# Patient Record
Sex: MALE | Race: WHITE | NOT HISPANIC OR LATINO | Employment: FULL TIME | URBAN - METROPOLITAN AREA
[De-identification: names, ages, dates, MRNs, and addresses within clinical notes are randomized per-mention and may not be internally consistent; named-entity substitution may affect disease eponyms.]

---

## 2023-06-30 ENCOUNTER — LAB VISIT (OUTPATIENT)
Dept: LAB | Facility: HOSPITAL | Age: 58
End: 2023-06-30
Attending: UROLOGY
Payer: COMMERCIAL

## 2023-06-30 ENCOUNTER — OFFICE VISIT (OUTPATIENT)
Dept: UROLOGY | Facility: CLINIC | Age: 58
End: 2023-06-30
Payer: COMMERCIAL

## 2023-06-30 VITALS
HEIGHT: 73 IN | SYSTOLIC BLOOD PRESSURE: 135 MMHG | DIASTOLIC BLOOD PRESSURE: 73 MMHG | BODY MASS INDEX: 33.9 KG/M2 | WEIGHT: 255.75 LBS | HEART RATE: 53 BPM

## 2023-06-30 DIAGNOSIS — R97.20 ELEVATED PSA: ICD-10-CM

## 2023-06-30 DIAGNOSIS — R97.20 ELEVATED PSA: Primary | ICD-10-CM

## 2023-06-30 DIAGNOSIS — R97.20 INCREASED PROSTATE SPECIFIC ANTIGEN (PSA) VELOCITY: ICD-10-CM

## 2023-06-30 LAB
BILIRUBIN, UA POC OHS: NEGATIVE
BLOOD, UA POC OHS: NEGATIVE
CLARITY, UA POC OHS: CLEAR
COLOR, UA POC OHS: YELLOW
CREAT SERPL-MCNC: 1.1 MG/DL (ref 0.5–1.4)
EST. GFR  (NO RACE VARIABLE): >60 ML/MIN/1.73 M^2
GLUCOSE, UA POC OHS: NEGATIVE
KETONES, UA POC OHS: NEGATIVE
LEUKOCYTES, UA POC OHS: ABNORMAL
NITRITE, UA POC OHS: NEGATIVE
PH, UA POC OHS: 6.5
PROSTATE SPECIFIC ANTIGEN, TOTAL: 3 NG/ML (ref 0–4)
PROTEIN, UA POC OHS: NEGATIVE
PSA FREE MFR SERPL: 23.67 %
PSA FREE SERPL-MCNC: 0.71 NG/ML (ref 0–1.5)
SPECIFIC GRAVITY, UA POC OHS: 1.02
UROBILINOGEN, UA POC OHS: 0.2

## 2023-06-30 PROCEDURE — 3078F PR MOST RECENT DIASTOLIC BLOOD PRESSURE < 80 MM HG: ICD-10-PCS | Mod: CPTII,S$GLB,, | Performed by: UROLOGY

## 2023-06-30 PROCEDURE — 3008F PR BODY MASS INDEX (BMI) DOCUMENTED: ICD-10-PCS | Mod: CPTII,S$GLB,, | Performed by: UROLOGY

## 2023-06-30 PROCEDURE — 99999 PR PBB SHADOW E&M-NEW PATIENT-LVL III: ICD-10-PCS | Mod: PBBFAC,,, | Performed by: UROLOGY

## 2023-06-30 PROCEDURE — 3008F BODY MASS INDEX DOCD: CPT | Mod: CPTII,S$GLB,, | Performed by: UROLOGY

## 2023-06-30 PROCEDURE — 1159F PR MEDICATION LIST DOCUMENTED IN MEDICAL RECORD: ICD-10-PCS | Mod: CPTII,S$GLB,, | Performed by: UROLOGY

## 2023-06-30 PROCEDURE — 99205 OFFICE O/P NEW HI 60 MIN: CPT | Mod: S$GLB,,, | Performed by: UROLOGY

## 2023-06-30 PROCEDURE — 1159F MED LIST DOCD IN RCRD: CPT | Mod: CPTII,S$GLB,, | Performed by: UROLOGY

## 2023-06-30 PROCEDURE — 82565 ASSAY OF CREATININE: CPT | Performed by: UROLOGY

## 2023-06-30 PROCEDURE — 87086 URINE CULTURE/COLONY COUNT: CPT | Performed by: UROLOGY

## 2023-06-30 PROCEDURE — 99999 PR PBB SHADOW E&M-NEW PATIENT-LVL III: CPT | Mod: PBBFAC,,, | Performed by: UROLOGY

## 2023-06-30 PROCEDURE — 81003 URINALYSIS AUTO W/O SCOPE: CPT | Mod: QW,S$GLB,, | Performed by: UROLOGY

## 2023-06-30 PROCEDURE — 1160F PR REVIEW ALL MEDS BY PRESCRIBER/CLIN PHARMACIST DOCUMENTED: ICD-10-PCS | Mod: CPTII,S$GLB,, | Performed by: UROLOGY

## 2023-06-30 PROCEDURE — 84154 ASSAY OF PSA FREE: CPT | Performed by: UROLOGY

## 2023-06-30 PROCEDURE — 3078F DIAST BP <80 MM HG: CPT | Mod: CPTII,S$GLB,, | Performed by: UROLOGY

## 2023-06-30 PROCEDURE — 81003 POCT URINALYSIS(INSTRUMENT): ICD-10-PCS | Mod: QW,S$GLB,, | Performed by: UROLOGY

## 2023-06-30 PROCEDURE — 3075F PR MOST RECENT SYSTOLIC BLOOD PRESS GE 130-139MM HG: ICD-10-PCS | Mod: CPTII,S$GLB,, | Performed by: UROLOGY

## 2023-06-30 PROCEDURE — 99205 PR OFFICE/OUTPT VISIT, NEW, LEVL V, 60-74 MIN: ICD-10-PCS | Mod: S$GLB,,, | Performed by: UROLOGY

## 2023-06-30 PROCEDURE — 36415 COLL VENOUS BLD VENIPUNCTURE: CPT | Performed by: UROLOGY

## 2023-06-30 PROCEDURE — 3075F SYST BP GE 130 - 139MM HG: CPT | Mod: CPTII,S$GLB,, | Performed by: UROLOGY

## 2023-06-30 PROCEDURE — 1160F RVW MEDS BY RX/DR IN RCRD: CPT | Mod: CPTII,S$GLB,, | Performed by: UROLOGY

## 2023-06-30 RX ORDER — ACETAMINOPHEN 325 MG/1
325 TABLET ORAL EVERY 6 HOURS PRN
COMMUNITY

## 2023-06-30 RX ORDER — TIRZEPATIDE 15 MG/.5ML
INJECTION, SOLUTION SUBCUTANEOUS
COMMUNITY
Start: 2023-05-11

## 2023-06-30 RX ORDER — ALFUZOSIN HYDROCHLORIDE 10 MG/1
10 TABLET, EXTENDED RELEASE ORAL
Qty: 30 TABLET | Refills: 11 | Status: SHIPPED | OUTPATIENT
Start: 2023-06-30 | End: 2024-06-29

## 2023-06-30 NOTE — PROGRESS NOTES
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 Stopped it about 1 month ago as was having orthostasis and had cards adjust PM settings but now back to baseline settings. Pacemaker for bradycardia . Dr Branch.  Tried daily cialis prior bc also mild ed but no real improvement in luts  Testosterone off and on for years. Stopped when psa went up 6 mos ago. Has been off   Wasn't taking it regularly anyways before - injected about every 6 weeks not every 2 weeks.   No known history of uti prostatitis.   Father  early 50s so no known CaP in family.   AUA symptom score 17/4 (4: Weak stream; 3: Emptying, intermittency, straining; 2: Frequency; 1: Urgency, sleeping)  Flomax helps 7/10 and felt that he was voiding better on it, but discontinued it as above.  Cscope at 51, and had another with 12mm polyp and is on 3 yr plan.  No hematuria. Is on tractor often has seen brown urine but also with poor fluid intake      Past Medical History:   Diagnosis Date    Weight loss        Past Surgical History:   Procedure Laterality Date    achilles tendon laser      INSERTION OF PACEMAKER      lasix to r eye       muscle sling to r eye       r wrist scope      right eye retnia detachment         No family history on file.    Social History     Socioeconomic History    Marital status:        Review of patient's allergies indicates:  No Known Allergies    Medications Reviewed: see MAR    Focused Physical Exam    Vitals:    23 0915   BP: 135/73   Pulse: (!) 53     Body mass index is 33.74 kg/m². Weight: 116 kg  "(255 lb 11.7 oz) Height: 6' 1" (185.4 cm)       Abdomen: Soft, non-tender, nondistended, no CVA tenderness  :  circ normal phallus without plaques/lesions, orthotopic urethral meatus, bilaterally desc testes in normal scrotum without mass or lesion  MARCIN: normal sphincter tone, no masses, no hemmorrhoids   PROSTATE: 40g, no nodules, non-tender, symmetrical.       LABS:    Recent Results (from the past 336 hour(s))   POCT Urinalysis(Instrument)    Collection Time: 06/30/23  9:32 AM   Result Value Ref Range    Color, POC UA Yellow Yellow, Straw, Colorless    Clarity, POC UA Clear Clear    Glucose, POC UA Negative Negative    Bilirubin, POC UA Negative Negative    Ketones, POC UA Negative Negative    Spec Grav POC UA 1.020 1.005 - 1.030    Blood, POC UA Negative Negative    pH, POC UA 6.5 5.0 - 8.0    Protein, POC UA Negative Negative    Urobilinogen, POC UA 0.2 <=1.0    Nitrite, POC UA Negative Negative    WBC, POC UA Small (A) Negative         Assessment/Diagnosis:    1. Elevated PSA  POCT Urinalysis(Instrument)    PSA, Total and Free    Creatinine, Serum    MRI Prostate W W/O Contrast    Urine culture      2. Increased prostate specific antigen (PSA) velocity  MRI Prostate W W/O Contrast          Plans:  I had a long discussion with the patient regarding the natural history of cancer in men as well as when diagnostics are indicated. We also discussed differential for elevated psa which also includes benign enlargement and prostatitis.  We did discuss that an elevated PSA is considered a PSA greater than 4 because statistically 20% of people in this value range are found to have prostate cancer, however we also discussed a bit about PSA velocity and trends and age specific psa elevations. Given his age adjusted elevation and velocity rise, I therefore recommended further evaluation to evaluate for underlying malignancy.  We discussed biopsy and in detail, including 1% risk of infectious complications including " sepsis but that it is an otherwise safe diagnostic procedure with expected hematuria hematospermia after.      However before moving to biopsy, given his history of testosterone replacement therapy which contributes directly to benign growth of prostate and prostate enlargement as well as worsening of obstructive lower urinary tract symptoms which he has, as well as for further evaluation and data, will get free and total PSA and MRI of the prostate.  We did discuss that free PSA percentage greater than 30% is normal with a single digit risk of underlying malignancy, and also noting the pitfalls that a free PSA is most accurate with a true PSA elevation between 4 and 10.  However, can look at trends.  We also reviewed that MRI prostate does not rule out disease if it is negative or has no concerning index lesions, however if positive, these index lesions can be targeted at time of biopsy    I went over the details of a transrectal ultrasound-guided biopsy of the prostate, and described the technique in detail.   The patient will be given local injection anesthetic to block the prostate so as to minimize any pain. 12-14 biopsy specimens will be taken. These will be sent for histopathology analysis.   Complications include bleeding, fever and chills. He was also instructed to watch for any signs of fever. If he does have any fever or chills after, he was advised to come to the emergency room right away for intravenous antibiotics and possible admission to the hospital. He is to refrain from any strenuous activity including sexual activity for the next 72 hours after biopsy. He was also advised that he may have blood while urinating, during bowel movements as well as during ejaculations. He was given a prebiopsy/postbiopsy instruction sheet was reminding him to avoid aspirin and blood thinners for 7 days prior, take the Rxed antibiotics the day before, day of, day after biopsy, and perform a fleet enema at home morning  of biopsy. All questions he had were answered in detail.      Instructions for prostate biopsy and procedure in detail reviewed as above in case we are to proceed, but will follow his PSA free and total and MRI of the prostate in the interim.  He does have a pacemaker, so will need cardiac clearance for MRI, and though his pacemaker is MRI safe, without established cardiology at our facility, can not have MRI prostate at [facility] will place referral to [facility].  Will get labs today and forward as well to [facility] with MRI referral.  Patient will get copy of his MRI safety card and we will cc Dr. Branch for clearance to proceed.    In regards to his moderate obstructive lower urinary tract symptoms with enlarged prostate on digital rectal exam and potential orthostasis with Flomax, we did have a risk benefit discussion about management of his BPH/LUTS as well whether or not malignancy is present.  I did recommend if we are to proceed with prostate biopsy to add on cystoscopy at the same time and discussed procedure in detail diagnostic flexible cystourethroscopy including the use of local anesthesia with xylocaine jelly and he is agreeable to proceed.  In the interim, advise trial of Uroxatral as alpha-blocker which has a lower side effect profile and may control his LUTS.  He did find good urinary symptom control benefit on Flomax, and consider going back on it, but would rather trial alternative alpha-blocker with lower side effect profile.  Certainly if similar effects, but is tolerable and LUTS are equivalent, can choose which went to use.  Likewise if has benefit on Uroxatral but not as much as Flomax, can try Flomax again and if asymptomatic continue.  However if symptoms persist should go back to alternative alpha-blocker.  If we are not to proceed with biopsy, will hold off on cystoscopy and lower tract evaluation at this time and continue to monitor his LUTS on medical therapy with revisit in the future as we  follow his PSA free and total every 6 months.  I did however advise him for both of the above issues to can to new remaining off of his testosterone replacement therapy.    Total time spent in/on encounter today, including face to face time with patient, counseling, medical record review, interpretation of tests/results, , and treatment plan coordination: 60 minutes

## 2023-06-30 NOTE — PATIENT INSTRUCTIONS
Discussed conservative measures to control urgency and frequency including   1. Avoiding/minimizing bladder irritants (see below), especially in afternoon and evening hours    Discussed bladder irritants include coffe (even decaf), tea, alcohol, soda, spicy foods, acidic juices (orange, tomato), vinegar, and artificial sweeteners/sugary beverages.    2. timed voiding - empty on a schedule (approx ~2-3 hours) in spite of need to urinate, to get ahead of urge    3. dont postponing voiding - dont hold it on purpose     4. bowel regimen as distended bowel has extrinsic compressive effect on bladder.   - any or all of the following in any combination, titrate to soft daily bowel movement without pushing or straining  - colace/stool softener capsule - once to twice daily  - miralax - 1 capful daily to start, can increase to 2x daily (or decrease to 1/2 cap daily)  - increase dietary fibery  - fiber supplements, such as metamucil  - prunes, prune juice    5. INCREASE water intake    6. Stop fluids 2 hours before bed, and urinate just before bed    7. Uroxatral daily as long as tolerable.  Can consider going back to Flomax if Uroxatral does not work as well, however if side effects of Flomax and did not have side effects on Uroxatral, resume Uroxatral pending further workup

## 2023-07-02 LAB — BACTERIA UR CULT: NORMAL

## 2023-07-02 NOTE — PROGRESS NOTES
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    Will follow up when results of MRI received/reviewed from [facility]     *MRI at [facility] as pt has pacemaker and cant be done at [facility].   Yina Shook as I believe she started the process of cardiac clearance for pacemaker deactivation from dr dangelo/as well as referral to [facility] for the mri and attaching labs/Cr to it, and can f/u on that piece later in week

## 2023-07-10 ENCOUNTER — TELEPHONE (OUTPATIENT)
Dept: UROLOGY | Facility: CLINIC | Age: 58
End: 2023-07-10
Payer: COMMERCIAL

## 2023-07-10 NOTE — TELEPHONE ENCOUNTER
"Cardio cl received from Dr Branch office in writing stated    "Clear for mri dis will need to contact rep for [employer] to be present at that time of mri"  [employer]-[phone]  "

## 2023-07-19 NOTE — TELEPHONE ENCOUNTER
Per AHRSHA Springer, pt requested MRI done at [facility] who noted can be done with pacemaker    Please fax order to [facility] radiology and associated note below about PM device/rep

## 2023-10-26 ENCOUNTER — PATIENT MESSAGE (OUTPATIENT)
Dept: UROLOGY | Facility: CLINIC | Age: 58
End: 2023-10-26
Payer: COMMERCIAL

## 2023-11-10 ENCOUNTER — LAB VISIT (OUTPATIENT)
Dept: LAB | Facility: HOSPITAL | Age: 58
End: 2023-11-10
Attending: UROLOGY
Payer: COMMERCIAL

## 2023-11-10 ENCOUNTER — OFFICE VISIT (OUTPATIENT)
Dept: UROLOGY | Facility: CLINIC | Age: 58
End: 2023-11-10
Payer: COMMERCIAL

## 2023-11-10 VITALS
HEART RATE: 52 BPM | HEIGHT: 73 IN | DIASTOLIC BLOOD PRESSURE: 81 MMHG | BODY MASS INDEX: 35.68 KG/M2 | SYSTOLIC BLOOD PRESSURE: 159 MMHG | WEIGHT: 269.19 LBS

## 2023-11-10 DIAGNOSIS — N40.1 BPH WITH OBSTRUCTION/LOWER URINARY TRACT SYMPTOMS: Primary | ICD-10-CM

## 2023-11-10 DIAGNOSIS — N13.8 BPH WITH OBSTRUCTION/LOWER URINARY TRACT SYMPTOMS: Primary | ICD-10-CM

## 2023-11-10 DIAGNOSIS — R97.20 INCREASED PROSTATE SPECIFIC ANTIGEN (PSA) VELOCITY: ICD-10-CM

## 2023-11-10 DIAGNOSIS — R97.20 INCREASED PROSTATE SPECIFIC ANTIGEN (PSA) VELOCITY: Primary | ICD-10-CM

## 2023-11-10 LAB
BILIRUBIN, UA POC OHS: NEGATIVE
BLOOD, UA POC OHS: NEGATIVE
CLARITY, UA POC OHS: CLEAR
COLOR, UA POC OHS: YELLOW
CREAT SERPL-MCNC: 1.1 MG/DL (ref 0.5–1.4)
EST. GFR  (NO RACE VARIABLE): >60 ML/MIN/1.73 M^2
GLUCOSE, UA POC OHS: NEGATIVE
KETONES, UA POC OHS: NEGATIVE
LEUKOCYTES, UA POC OHS: NEGATIVE
NITRITE, UA POC OHS: NEGATIVE
PH, UA POC OHS: 6.5
POC RESIDUAL URINE VOLUME: 21 ML (ref 0–100)
PROTEIN, UA POC OHS: NEGATIVE
SPECIFIC GRAVITY, UA POC OHS: 1.01
UROBILINOGEN, UA POC OHS: 0.2

## 2023-11-10 PROCEDURE — 99215 PR OFFICE/OUTPT VISIT, EST, LEVL V, 40-54 MIN: ICD-10-PCS | Mod: S$GLB,,, | Performed by: UROLOGY

## 2023-11-10 PROCEDURE — 81003 URINALYSIS AUTO W/O SCOPE: CPT | Mod: QW,S$GLB,, | Performed by: UROLOGY

## 2023-11-10 PROCEDURE — 1159F PR MEDICATION LIST DOCUMENTED IN MEDICAL RECORD: ICD-10-PCS | Mod: CPTII,S$GLB,, | Performed by: UROLOGY

## 2023-11-10 PROCEDURE — 1159F MED LIST DOCD IN RCRD: CPT | Mod: CPTII,S$GLB,, | Performed by: UROLOGY

## 2023-11-10 PROCEDURE — 3008F PR BODY MASS INDEX (BMI) DOCUMENTED: ICD-10-PCS | Mod: CPTII,S$GLB,, | Performed by: UROLOGY

## 2023-11-10 PROCEDURE — 3079F PR MOST RECENT DIASTOLIC BLOOD PRESSURE 80-89 MM HG: ICD-10-PCS | Mod: CPTII,S$GLB,, | Performed by: UROLOGY

## 2023-11-10 PROCEDURE — 99215 OFFICE O/P EST HI 40 MIN: CPT | Mod: S$GLB,,, | Performed by: UROLOGY

## 2023-11-10 PROCEDURE — 51798 US URINE CAPACITY MEASURE: CPT | Mod: S$GLB,,, | Performed by: UROLOGY

## 2023-11-10 PROCEDURE — 3077F SYST BP >= 140 MM HG: CPT | Mod: CPTII,S$GLB,, | Performed by: UROLOGY

## 2023-11-10 PROCEDURE — 3079F DIAST BP 80-89 MM HG: CPT | Mod: CPTII,S$GLB,, | Performed by: UROLOGY

## 2023-11-10 PROCEDURE — 3008F BODY MASS INDEX DOCD: CPT | Mod: CPTII,S$GLB,, | Performed by: UROLOGY

## 2023-11-10 PROCEDURE — 36415 COLL VENOUS BLD VENIPUNCTURE: CPT | Performed by: UROLOGY

## 2023-11-10 PROCEDURE — 99999 PR PBB SHADOW E&M-EST. PATIENT-LVL III: ICD-10-PCS | Mod: PBBFAC,,, | Performed by: UROLOGY

## 2023-11-10 PROCEDURE — 1160F PR REVIEW ALL MEDS BY PRESCRIBER/CLIN PHARMACIST DOCUMENTED: ICD-10-PCS | Mod: CPTII,S$GLB,, | Performed by: UROLOGY

## 2023-11-10 PROCEDURE — 3077F PR MOST RECENT SYSTOLIC BLOOD PRESSURE >= 140 MM HG: ICD-10-PCS | Mod: CPTII,S$GLB,, | Performed by: UROLOGY

## 2023-11-10 PROCEDURE — 81003 POCT URINALYSIS(INSTRUMENT): ICD-10-PCS | Mod: QW,S$GLB,, | Performed by: UROLOGY

## 2023-11-10 PROCEDURE — 51798 POCT BLADDER SCAN: ICD-10-PCS | Mod: S$GLB,,, | Performed by: UROLOGY

## 2023-11-10 PROCEDURE — 1160F RVW MEDS BY RX/DR IN RCRD: CPT | Mod: CPTII,S$GLB,, | Performed by: UROLOGY

## 2023-11-10 PROCEDURE — 82565 ASSAY OF CREATININE: CPT | Performed by: UROLOGY

## 2023-11-10 PROCEDURE — 99999 PR PBB SHADOW E&M-EST. PATIENT-LVL III: CPT | Mod: PBBFAC,,, | Performed by: UROLOGY

## 2023-11-10 PROCEDURE — 84153 ASSAY OF PSA TOTAL: CPT | Performed by: UROLOGY

## 2023-11-10 RX ORDER — ATORVASTATIN CALCIUM 10 MG/1
TABLET, FILM COATED ORAL
COMMUNITY
Start: 2023-10-31

## 2023-11-10 NOTE — PROGRESS NOTES
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 Stopped it about 1 month ago as was having orthostasis and had cards adjust PM settings but now back to baseline settings. Pacemaker for bradycardia . Dr Branch.  Tried daily cialis prior bc also mild ed but no real improvement in luts  Testosterone off and on for years. Stopped when psa went up 6 mos ago. Has been off   Wasn't taking it regularly anyways before - injected about every 6 weeks not every 2 weeks.   No known history of uti prostatitis.   Father  early 50s - so no known CaP in family.   AUA symptom score 17/4 (4: Weak stream; 3: Emptying, intermittency, straining; 2: Frequency; 1: Urgency, sleeping)  Flomax helps 7/10 and felt that he was voiding better on it, but discontinued it as above.  Cscope at 51, and had another with 12mm polyp and is on 3 yr plan.  No hematuria. Is on tractor often has seen brown urine but also with poor fluid intake    Repeat psa 23 was 3.0 (23% free)  MRI of the prostate was done (though without contrast) at [facility] on 23 noting the prostate gland is 65 g at 5.7 x 4.7 x 4.6 cm and negative/PI-rad 2 only.  Tried uroxatral and didn't see much difference on flomax vs uroxatral so stayed on uroxatral   AUA symptom score 15/4, mostly satisfied (4: Weak stream; 3: Urgency; 2: Emptying, frequency, straining; 1: Intermittency, sleeping).  Medication helps 4/10.    Postvoid residual by bladder scan is 21 cc.  Urinalysis dipstick is negative.   "  urgency has been progressive.  No significant bothersome frequency during the day.  When the urge comes on, he can always make it to the bathroom no urge incontinence.    But this has been progressive.      Focused Physical Exam:    Vitals:    11/10/23 1045   BP: (!) 159/81   Pulse: (!) 52     Body mass index is 35.51 kg/m². Weight: 122.1 kg (269 lb 2.9 oz) Height: 6' 1" (185.4 cm)     Abdomen: Soft, non-tender, nondistended, no CVA tenderness    Recent Results (from the past 336 hour(s))   POCT Urinalysis(Instrument)    Collection Time: 11/10/23 10:59 AM   Result Value Ref Range    Color, POC UA Yellow Yellow, Straw, Colorless    Clarity, POC UA Clear Clear    Glucose, POC UA Negative Negative    Bilirubin, POC UA Negative Negative    Ketones, POC UA Negative Negative    Spec Grav POC UA 1.015 1.005 - 1.030    Blood, POC UA Negative Negative    pH, POC UA 6.5 5.0 - 8.0    Protein, POC UA Negative Negative    Urobilinogen, POC UA 0.2 <=1.0    Nitrite, POC UA Negative Negative    WBC, POC UA Negative Negative   POCT Bladder Scan    Collection Time: 11/10/23 10:59 AM   Result Value Ref Range    POC Residual Urine Volume 21 0 - 100 mL       ASSESSMENT   1. BPH with obstruction/lower urinary tract symptoms  Procedure Order to Urology      2. Increased prostate specific antigen (PSA) velocity  POCT Urinalysis(Instrument)    POCT Bladder Scan    PSA, Total and Free    Creatinine, Serum          Plan    Reviewed updated PSA from 6 months ago noting still borderline for his age but very normal free PSA, and then reviewed MRI done that had no index lesions however also had no contrast yet did note a gland size of 65 g thus his PSA is all within normal PSA density and with normal digital rectal exam reasonable free PSA and large prostate, no concerns for malignancy at this time.  In the interim few months he has had progression of his weak stream and urgency and LUTS despite alpha-blocker.  Has tried 2 different alpha " blockers without significant difference in symptoms with either 1 so has remained on Uroxatral.  He is bothered by his obstructive lower urinary tract symptoms and is interested in BPH intervention for better urinary quality of life and to eliminate the need for medical management.  We did have extensive discussion about lower tract workup to help guide further recommendations and procedures in detail diagnostic flexible cystourethroscopy including the use of local anesthesia with xylocaine jelly, and concurrent transrectal ultrasound-guided volumetric measurement of the prostate (not mandatory secondary to volumetric measurement on MRI, however more accurate, especially with certain dimensions which may help guide treatment recommendations).  He is agreeable to proceed and cysto/truss was scheduled at the [facility] per his request unavailability on Friday 1/12/24.  Will continue alpha-blocker until that time, and reviewed conservative recommendations for urgency frequency.  As well, advised reassessment of PSA free and total prior to proceeding with BPH evaluation and intervention.  Will get labs today.  Will include creatinine in case there is any true elevation of PSA for which MRI with contrast would be recommended, otherwise if PSA is stable, proceed with BPH workup as above.  Will chart check labs      Total time spent in/on encounter today, including face to face time with patient, counseling, medical record review, interpretation of tests/results, , and treatment plan coordination: 40 minutes

## 2023-11-10 NOTE — PROGRESS NOTES
Procedure Order to Urology [[id number]]    Electronically signed by: Av Brantley MD on 11/10/23 1144 Status: Active   Ordering user: Av Brantley MD 11/10/23 1144 Authorized by: Av Brantley MD   Ordering mode: Standard   Frequency:  11/10/23 -     Diagnoses   BPH with obstruction/lower urinary tract symptoms [N40.1, N13.8]   Questionnaire    Question Answer   Procedure Cystoscopy Comment - 1/12 fri [facility]   TRUS/Trans Rectal Ultrasound   Facility Name: [facility], Please order Urine POCT without micro . Local sedation/trus

## 2023-11-14 LAB
PROSTATE SPECIFIC ANTIGEN, TOTAL: 3.7 NG/ML (ref 0–4)
PSA FREE MFR SERPL: 21.35 %
PSA FREE SERPL-MCNC: 0.79 NG/ML (ref 0–1.5)

## 2023-11-26 ENCOUNTER — PATIENT MESSAGE (OUTPATIENT)
Dept: SURGERY | Facility: HOSPITAL | Age: 58
End: 2023-11-26

## 2023-11-26 DIAGNOSIS — R97.20 ELEVATED PSA: Primary | ICD-10-CM

## 2023-12-05 RX ORDER — CIPROFLOXACIN 500 MG/1
500 TABLET ORAL 2 TIMES DAILY
Qty: 4 TABLET | Refills: 0 | Status: SHIPPED | OUTPATIENT
Start: 2023-12-05

## 2023-12-05 NOTE — TELEPHONE ENCOUNTER
See portal messages  Updated procedure with asc to cysto/prosBx and sent meds/instructions  Book MRI prostate with Cr on arrival at [facility] before years end per dr's preference (usually Fridays)

## 2024-05-07 ENCOUNTER — TELEPHONE (OUTPATIENT)
Dept: HEMATOLOGY/ONCOLOGY | Facility: CLINIC | Age: 59
End: 2024-05-07

## 2024-05-07 NOTE — TELEPHONE ENCOUNTER
Spoke with patient and at this time he was seen by the pulmonologist and he states that tests were repeated and all came back looking good. Said that they think the clot came from the broken ankle and that if he feels he needs to be seen by Dr. Medina he will make an appt. Advised pt to call with any concerns. Pt verbalized understanding.

## 2024-06-11 ENCOUNTER — PATIENT MESSAGE (OUTPATIENT)
Dept: UROLOGY | Facility: CLINIC | Age: 59
End: 2024-06-11
Payer: COMMERCIAL

## 2024-06-11 DIAGNOSIS — R97.20 INCREASED PROSTATE SPECIFIC ANTIGEN (PSA) VELOCITY: Primary | ICD-10-CM

## 2024-08-02 ENCOUNTER — LAB VISIT (OUTPATIENT)
Dept: LAB | Facility: HOSPITAL | Age: 59
End: 2024-08-02
Attending: UROLOGY
Payer: COMMERCIAL

## 2024-08-02 DIAGNOSIS — R97.20 INCREASED PROSTATE SPECIFIC ANTIGEN (PSA) VELOCITY: ICD-10-CM

## 2024-08-02 LAB
PROSTATE SPECIFIC ANTIGEN, TOTAL: 3.6 NG/ML (ref 0–4)
PSA FREE MFR SERPL: 24.17 %
PSA FREE SERPL-MCNC: 0.87 NG/ML (ref 0–1.5)

## 2024-08-02 PROCEDURE — 36415 COLL VENOUS BLD VENIPUNCTURE: CPT | Performed by: UROLOGY

## 2024-08-16 ENCOUNTER — OFFICE VISIT (OUTPATIENT)
Dept: UROLOGY | Facility: CLINIC | Age: 59
End: 2024-08-16
Payer: COMMERCIAL

## 2024-08-16 VITALS
WEIGHT: 274 LBS | DIASTOLIC BLOOD PRESSURE: 79 MMHG | BODY MASS INDEX: 36.31 KG/M2 | HEIGHT: 73 IN | SYSTOLIC BLOOD PRESSURE: 143 MMHG | HEART RATE: 58 BPM

## 2024-08-16 DIAGNOSIS — N40.1 BPH WITH OBSTRUCTION/LOWER URINARY TRACT SYMPTOMS: ICD-10-CM

## 2024-08-16 DIAGNOSIS — N13.8 BPH WITH OBSTRUCTION/LOWER URINARY TRACT SYMPTOMS: ICD-10-CM

## 2024-08-16 DIAGNOSIS — R97.20 INCREASED PROSTATE SPECIFIC ANTIGEN (PSA) VELOCITY: Primary | ICD-10-CM

## 2024-08-16 LAB
BILIRUBIN, UA POC OHS: NEGATIVE
BLOOD, UA POC OHS: NEGATIVE
CLARITY, UA POC OHS: CLEAR
COLOR, UA POC OHS: YELLOW
GLUCOSE, UA POC OHS: NEGATIVE
KETONES, UA POC OHS: NEGATIVE
LEUKOCYTES, UA POC OHS: NEGATIVE
NITRITE, UA POC OHS: NEGATIVE
PH, UA POC OHS: 5.5
POC RESIDUAL URINE VOLUME: 45 ML (ref 0–100)
PROTEIN, UA POC OHS: NEGATIVE
SPECIFIC GRAVITY, UA POC OHS: 1.01
UROBILINOGEN, UA POC OHS: 0.2

## 2024-08-16 PROCEDURE — 99999 PR PBB SHADOW E&M-EST. PATIENT-LVL III: CPT | Mod: PBBFAC,,, | Performed by: UROLOGY

## 2024-08-16 RX ORDER — SILDENAFIL 100 MG/1
100 TABLET, FILM COATED ORAL
Qty: 10 TABLET | Refills: 10 | Status: SHIPPED | OUTPATIENT
Start: 2024-08-16 | End: 2025-08-16

## 2024-08-16 RX ORDER — VALACYCLOVIR HYDROCHLORIDE 1 G/1
TABLET, FILM COATED ORAL
COMMUNITY

## 2024-08-16 RX ORDER — PANTOPRAZOLE SODIUM 40 MG/1
40 TABLET, DELAYED RELEASE ORAL
COMMUNITY

## 2024-08-16 RX ORDER — CIPROFLOXACIN 500 MG/1
500 TABLET ORAL 2 TIMES DAILY
Qty: 6 TABLET | Refills: 0 | Status: SHIPPED | OUTPATIENT
Start: 2024-08-16 | End: 2024-08-19

## 2024-08-16 NOTE — PROGRESS NOTES
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 Stopped it about 1 month ago as was having orthostasis and had cards adjust PM settings but now back to baseline settings. Pacemaker for bradycardia . Dr Branch.  Tried daily cialis prior bc also mild ed but no real improvement in luts  Testosterone off and on for years. Stopped when psa went up 6 mos ago. Has been off   Wasn't taking it regularly anyways before - injected about every 6 weeks not every 2 weeks.   No known history of uti prostatitis.   Father  early 50s - so no known CaP in family.   AUA symptom score 17/4 (4: Weak stream; 3: Emptying, intermittency, straining; 2: Frequency; 1: Urgency, sleeping)  Flomax helps 7/10 and felt that he was voiding better on it, but discontinued it as above.  Cscope at 51, and had another with 12mm polyp and is on 3 yr plan.  No hematuria. Is on tractor often has seen brown urine but also with poor fluid intake    Last seen 2023   Repeat psa 23 was 3.0 (23% free)  MRI of the prostate was done (though without contrast) at [facility] on 23 noting the prostate gland is 65 g at 5.7 x 4.7 x 4.6 cm and negative/PI-rad 2 only.  Tried uroxatral and didn't see much difference on flomax vs uroxatral so stayed on uroxatral   AUA symptom score 15/4, mostly satisfied (4: Weak stream; 3: Urgency; 2: Emptying, frequency, straining; 1: Intermittency, sleeping).  Medication helps 4/10.    Postvoid residual by bladder scan is 21 cc.  Urinalysis  "dipstick is negative.    urgency has been progressive.  No significant bothersome frequency during the day.  When the urge comes on, he can always make it to the bathroom no urge incontinence.    But this has been progressive.    In interim after last visit continued Uroxatral and plan lower tract evaluation with cysto/truss in January, and discuss repeating MRI with contrast however due to pacemaker issues did not do so, after risk benefit discussion, and then ultimately postpone procedures secondary to ankle fracture DVT/PE/blood thinners.  Returns today with repeat PSA for re-evaluation of labs and LUTS  Notes:  No longer on bloodthinners. Off since may. Had interim DVT/PE from ankle fracture.  Only ever did TRT a few months years ago. Didn't seer much benefit  LUTS seemed to be progressing even despite starting medication at last visit.  Previously was taken off Flomax by Cardiology due to blood pressure concerns.  Daily Cialis was not improving LUTS nor ED, and started Uroxatral last visit.  AUA symptom score today is 14/5, unhappy (3: Frequency, urgency; 2: Emptying, weak stream, straining; 1: Intermittency, sleeping) medication helps 4/10  Notes prolonged voiding times.  A bit more urinary frequency.  Daytime frequency at least 8-10 times.  Does drink tea throughout the day but has limited fluid intake overall due to his urinary frequency.  Has had some close urgency lately which is new.  Motorcylce/riding mowers  Baseline ED - viagra in past worked better than cialis with headache. Daily cialis no benefit.  Recent wt gain after ankle fx    Focused Physical Exam:    Vitals:    08/16/24 1002   BP: (!) 143/79   Pulse: (!) 58     Body mass index is 36.15 kg/m². Weight: 124.3 kg (274 lb) Height: 6' 1" (185.4 cm)     Abdomen: Soft, non-tender, nondistended, no CVA tenderness  : defer to cysto trus    Recent Results (from the past 336 hour(s))   POCT Bladder Scan    Collection Time: 08/16/24 10:03 AM   Result " Value Ref Range    POC Residual Urine Volume 45 0 - 100 mL   POCT Urinalysis(Instrument)    Collection Time: 08/16/24 10:06 AM   Result Value Ref Range    Color, POC UA Yellow Yellow, Straw, Colorless    Clarity, POC UA Clear Clear    Glucose, POC UA Negative Negative    Bilirubin, POC UA Negative Negative    Ketones, POC UA Negative Negative    Spec Grav POC UA 1.015 1.005 - 1.030    Blood, POC UA Negative Negative    pH, POC UA 5.5 5.0 - 8.0    Protein, POC UA Negative Negative    Urobilinogen, POC UA 0.2 <=1.0    Nitrite, POC UA Negative Negative    WBC, POC UA Negative Negative       ASSESSMENT   1. Increased prostate specific antigen (PSA) velocity  POCT Urinalysis(Instrument)    POCT Bladder Scan      2. BPH with obstruction/lower urinary tract symptoms  POCT Urinalysis(Instrument)    POCT Bladder Scan          Plan    We had previously discussed at minimum lower tract evaluation to help guide BPH recommendations for intervention with cystoscopy and prostate ultrasound.  Given his history of borderline PSA, also had risk benefit discussion about prostate biopsy before proceeding with BPH intervention to verify no early diagnosis of underlying malignancy and verify BPH before proceeding.  Again reviewed soft indication, noting that MRI of the prostate was done, however given pacemaker and scheduling was done without contrast though does indicate a normal PSA density.  His prostate size of 65 g most likely supports his borderline PSA.  While free PSA is not greater than 30%, has been in the mid 20% range, which is quite normal for a PSA less than 4, as the value of free PSA is when the PSA is elevated between 4 in 10.  His PSA was elevated to 3.94 in the past, almost 4, however has been stable lately even with a rise from 3-3.7, now stable at 3.6 with 24% free PSA.  Discussed certainly it is reasonable to proceed with BPH workup only as his progressive LUTS are more of a problem and long-term complications from  continued obstruction will could be more bothersome at this point, and with a normal PSA density and these factors the risk of underlying malignancy is quite low, however also again noted with transrectal ultrasound, transrectal ultrasound-guided biopsy now off blood thinners at little risk and little time to the procedure, would prefer to have these diagnostics upfront before proceeding with BPH intervention, which is quite reasonable.   He elected to proceed with cystoscopy and prostate biopsy on 9/27/24.  ASC notify to pull from scheduling depot and placed on schedule.  Follow-up set one-week later to review pathology and lower tract findings to make further plans for his BPH and obstruction, pathology dependent.  Re ED discussed multifactorially etiologies, and management options. Prefers trial of on demand oral meds. Has been years.   Sildenafil Rx sent. Proper use discussed.    Total time spent in/on encounter today, including face to face time with patient, counseling, medical record review, interpretation of tests/results, , and treatment plan coordination: 40 minutes

## 2024-08-16 NOTE — PATIENT INSTRUCTIONS
BEFORE YOUR cystoscopy and Prostate Biopsy on 9/27/24   1. 3 Days Before: Hold Eliquis   2. 7 Days Before: NO Fish oil, Omega 3, Vitamin E, Goodies/ BC Powders, Daniella-[place], Aspirin, Plavix, Coumadin, Heparin, Lovenox and or Celebrex   3. Take antibiotics as prescribed- Cipro 500mg twice daily (AM/PM)    A Day before biopsy (AM/PM)   B Day of biopsy (AM/PM)    C.Day after biopsy (AM/PM)   4. 1 fleet enema (Over the counter) AT HOME morning of the procedure before arriving (at least 1-2 hours before leaving home)   5. LOCAL ANESTHESIA: no fasting needed.please eat that day breakfast and/or lunch. Can drive yourself to/from     AFTER   1. AVOID sexual activity, lifting, strenuous physical activity or exertion for 3 days following biopsy.    2. NO riding mowers, tractors, bicycles, motorcycles for 2-3 weeks.   3. You may experience blood in your urine or stool for up to 2 WEEKS and in your semen for up to 6 WEEKS. THIS IS A NORMAL SIDE EFFECT OF PROCEDURE AND WILL RESOLVE !   4. DRINK, DRINK, DRINK PLENTY WATER !!!    5. Take antibiotics as prescribed above    6. If you experience any of the following conditions, please return immediately to the clinic (during office hours) or Emergency Room (if after hours): - FEVER - INABILITY TO URINATE - SEVERE BLEEDING    7. You may resume aspirin, anti-inflammatory and blood thinners in 3 days    Discussed conservative measures to control urgency and frequency including   1. Avoiding/minimizing bladder irritants (see below), especially in afternoon and evening hours    Discussed bladder irritants include coffe (even decaf), tea, alcohol, soda, spicy foods, acidic juices (orange, tomato), vinegar, and artificial sweeteners/sugary beverages.    2. timed voiding - empty on a schedule (approx ~2-3 hours) in spite of need to urinate, to get ahead of urge    3. dont postponing voiding - dont hold it on purpose     4. bowel regimen as distended bowel has extrinsic compressive effect on  bladder.   - any or all of the following in any combination, titrate to soft daily bowel movement without pushing or straining  - colace/stool softener capsule - once to twice daily  - miralax - 1 capful daily to start, can increase to 2x daily (or decrease to 1/2 cap daily)  - increase dietary fibery  - fiber supplements, such as metamucil  - prunes, prune juice    5. INCREASE water intake    6. Stop fluids 2 hours before bed, and urinate just before bed

## 2024-09-20 RX ORDER — CIPROFLOXACIN 500 MG/1
500 TABLET ORAL
COMMUNITY

## 2024-09-20 NOTE — DISCHARGE INSTRUCTIONS
After the procedure    Drink plenty of fluids.  You may have burning or light bleeding when you urinate--this is normal.  Medications may be prescribed to ease any discomfort or prevent infection. Take these as directed.  Call your doctor if you have heavy bleeding or blood clots, burning that lasts more than a day, a fever over 100°F  (38° C), or trouble urinating.    After Surgery:  Always be aware that any surgery can cause these symptoms:    Pain- Medication can be prescribed for pain to decrease your pain but may not completely take your pain away.  Over the Counter pain medicine my be enough and you can always use Ice and rest to help ease pain.    Bleeding- a little bleeding after a surgery is usually within normal.  If there is a lot of blood you need to notify your MD.  Emergency treatments of bleeding are cold application, elevation of the bleeding site and compression.    Infection- Infection after surgery is NOT a normal occurrence.  Signs of infection are fever, swelling, hot to touch the incision.  If this occurs notify your MD immediately.    Nausea- this can be common after a surgery especially if you have had anesthesia medicine or are taking pain medicine.  Staying on clear liquids, bland foods, gingerale, or over the counter anti nausea medicines can help.  If you vomit more than once, notify your MD.  Anti Nausea medicines can be prescribed.        After your prostate biopsy    Avoid sexual activity,lifting, strenuous physical activity or exertion for 3 days     No riding mowers, tractors, bicycles, motorcycles for 2-3 weeks    You may experience blood in your urine or stool for up to 2 weeks and in your semen for up to 6 weeks.  This is a normal side effect of the procedure and will resolve.    Drink plenty of water    Take antibiotics as prescribed    If you experience any of the following conditions, please return immediately to the clinic (during office hrs) or the Emergency Room if  after hours:       Fever       Inabiltiy to urinate       Severe bleeding    You may resume aspirin, anti inflammatory and blood thinners in 3 days    Results take at minimum 10 business days.  A 2 week follow up will be scheduled to review pathology reports in the clinic    During office hours, please call  and ask to speak with the nurse if you have any questions.  If after hours, call the [facility] On Call # to be connectied to the doctor on call    After Surgery:  Always be aware that any surgery can cause these symptoms:    Pain- Medication can be prescribed for pain to decrease your pain but may not completely take your pain away.  Over the Counter pain medicine my be enough and you can always use Ice and rest to help ease pain.    Bleeding- a little bleeding after a surgery is usually within normal.  If there is a lot of blood you need to notify your MD.  Emergency treatments of bleeding are cold application, elevation of the bleeding site and compression.    Infection- Infection after surgery is NOT a normal occurrence.  Signs of infection are fever, swelling, hot to touch the incision.  If this occurs notify your MD immediately.    Nausea- this can be common after a surgery especially if you have had anesthesia medicine or are taking pain medicine.  Staying on clear liquids, bland foods, gingerale, or over the counter anti nausea medicines can help.  If you vomit more than once, notify your MD.  Anti Nausea medicines can be prescribed.

## 2024-09-27 ENCOUNTER — HOSPITAL ENCOUNTER (OUTPATIENT)
Facility: HOSPITAL | Age: 59
Discharge: HOME | End: 2024-09-27
Attending: UROLOGY | Admitting: UROLOGY
Payer: COMMERCIAL

## 2024-09-27 DIAGNOSIS — R97.20 INCREASED PROSTATE SPECIFIC ANTIGEN (PSA) VELOCITY: ICD-10-CM

## 2024-09-27 LAB
BILIRUBIN, UA POC OHS: NEGATIVE
BLOOD, UA POC OHS: NEGATIVE
CLARITY, UA POC OHS: CLEAR
COLOR, UA POC OHS: YELLOW
GLUCOSE, UA POC OHS: NEGATIVE
KETONES, UA POC OHS: NEGATIVE
LEUKOCYTES, UA POC OHS: ABNORMAL
NITRITE, UA POC OHS: NEGATIVE
PH, UA POC OHS: 5.5
PROTEIN, UA POC OHS: NEGATIVE
SPECIFIC GRAVITY, UA POC OHS: >=1.03
UROBILINOGEN, UA POC OHS: 0.2

## 2024-09-27 PROCEDURE — 55700 HC PROSTATE NEEDLE BIOPSY: CPT | Performed by: UROLOGY

## 2024-09-27 PROCEDURE — 76872 US TRANSRECTAL: CPT | Performed by: UROLOGY

## 2024-09-27 PROCEDURE — 55700 PR BIOPSY OF PROSTATE,NEEDLE/PUNCH: CPT | Mod: ,,, | Performed by: UROLOGY

## 2024-09-27 PROCEDURE — 76872 US TRANSRECTAL: CPT | Mod: 26,,, | Performed by: UROLOGY

## 2024-09-27 PROCEDURE — 25000003 PHARM REV CODE 250: Performed by: UROLOGY

## 2024-09-27 PROCEDURE — 52000 CYSTOURETHROSCOPY: CPT | Performed by: UROLOGY

## 2024-09-27 PROCEDURE — 63600175 PHARM REV CODE 636 W HCPCS: Performed by: UROLOGY

## 2024-09-27 PROCEDURE — 52000 CYSTOURETHROSCOPY: CPT | Mod: 59,,, | Performed by: UROLOGY

## 2024-09-27 RX ORDER — LIDOCAINE HYDROCHLORIDE 20 MG/ML
JELLY TOPICAL
Status: DISCONTINUED | OUTPATIENT
Start: 2024-09-27 | End: 2024-09-27 | Stop reason: HOSPADM

## 2024-09-27 RX ORDER — LIDOCAINE HYDROCHLORIDE 20 MG/ML
INJECTION, SOLUTION EPIDURAL; INFILTRATION; INTRACAUDAL; PERINEURAL
Status: DISCONTINUED | OUTPATIENT
Start: 2024-09-27 | End: 2024-09-27 | Stop reason: HOSPADM

## 2024-09-27 RX ORDER — GENTAMICIN 40 MG/ML
160 INJECTION, SOLUTION INTRAMUSCULAR; INTRAVENOUS ONCE
Status: COMPLETED | OUTPATIENT
Start: 2024-09-27 | End: 2024-09-27

## 2024-09-27 RX ADMIN — GENTAMICIN SULFATE 160 MG: 40 INJECTION, SOLUTION INTRAMUSCULAR; INTRAVENOUS at 07:09

## 2024-09-27 NOTE — PLAN OF CARE
Patient has no additional questions. He declined anything to drink.   Siliq Counseling:  I discussed with the patient the risks of Siliq including but not limited to new or worsening depression, suicidal thoughts and behavior, immunosuppression, malignancy, posterior leukoencephalopathy syndrome, and serious infections.  The patient understands that monitoring is required including a PPD at baseline and must alert us or the primary physician if symptoms of infection or other concerning signs are noted. There is also a special program designed to monitor depression which is required with Siliq.

## 2024-09-27 NOTE — H&P
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 Stopped it about 1 month ago as was having orthostasis and had cards adjust PM settings but now back to baseline settings. Pacemaker for bradycardia . Dr Branch.  Tried daily cialis prior bc also mild ed but no real improvement in luts  Testosterone off and on for years. Stopped when psa went up 6 mos ago. Has been off   Wasn't taking it regularly anyways before - injected about every 6 weeks not every 2 weeks.   No known history of uti prostatitis.   Father  early 50s - so no known CaP in family.   AUA symptom score 17/4 (4: Weak stream; 3: Emptying, intermittency, straining; 2: Frequency; 1: Urgency, sleeping)  Flomax helps 7/10 and felt that he was voiding better on it, but discontinued it as above.  Cscope at 51, and had another with 12mm polyp and is on 3 yr plan.  No hematuria. Is on tractor often has seen brown urine but also with poor fluid intake     Last seen 2023   Repeat psa 23 was 3.0 (23% free)  MRI of the prostate was done (though without contrast) at [facility] on 23 noting the prostate gland is 65 g at 5.7 x 4.7 x 4.6 cm and negative/PI-rad 2 only.  Tried uroxatral and didn't see much difference on flomax vs uroxatral so stayed on uroxatral   AUA symptom score 15/4, mostly satisfied (4: Weak stream; 3: Urgency; 2: Emptying, frequency, straining; 1: Intermittency, sleeping).  Medication helps 4/10.    Postvoid residual by bladder scan is 21 cc.  Urinalysis  "dipstick is negative.    urgency has been progressive.  No significant bothersome frequency during the day.  When the urge comes on, he can always make it to the bathroom no urge incontinence.    But this has been progressive.     In interim after last visit continued Uroxatral and plan lower tract evaluation with cysto/truss in January, and discuss repeating MRI with contrast however due to pacemaker issues did not do so, after risk benefit discussion, and then ultimately postpone procedures secondary to ankle fracture DVT/PE/blood thinners.  Returns today with repeat PSA for re-evaluation of labs and LUTS  Notes:  No longer on bloodthinners. Off since may. Had interim DVT/PE from ankle fracture.  Only ever did TRT a few months years ago. Didn't seer much benefit  LUTS seemed to be progressing even despite starting medication at last visit.  Previously was taken off Flomax by Cardiology due to blood pressure concerns.  Daily Cialis was not improving LUTS nor ED, and started Uroxatral last visit.  AUA symptom score today is 14/5, unhappy (3: Frequency, urgency; 2: Emptying, weak stream, straining; 1: Intermittency, sleeping) medication helps 4/10  Notes prolonged voiding times.  A bit more urinary frequency.  Daytime frequency at least 8-10 times.  Does drink tea throughout the day but has limited fluid intake overall due to his urinary frequency.  Has had some close urgency lately which is new.  Motorcylce/riding mowers  Baseline ED - viagra in past worked better than cialis with headache. Daily cialis no benefit.  Recent wt gain after ankle fx     Focused Physical Exam:         Vitals:     08/16/24 1002   BP: (!) 143/79   Pulse: (!) 58      Body mass index is 36.15 kg/m². Weight: 124.3 kg (274 lb) Height: 6' 1" (185.4 cm)      Abdomen: Soft, non-tender, nondistended, no CVA tenderness  : defer to cysto trus     Recent Results         Recent Results (from the past 336 hour(s))   POCT Bladder Scan     Collection " Time: 08/16/24 10:03 AM   Result Value Ref Range     POC Residual Urine Volume 45 0 - 100 mL   POCT Urinalysis(Instrument)     Collection Time: 08/16/24 10:06 AM   Result Value Ref Range     Color, POC UA Yellow Yellow, Straw, Colorless     Clarity, POC UA Clear Clear     Glucose, POC UA Negative Negative     Bilirubin, POC UA Negative Negative     Ketones, POC UA Negative Negative     Spec Grav POC UA 1.015 1.005 - 1.030     Blood, POC UA Negative Negative     pH, POC UA 5.5 5.0 - 8.0     Protein, POC UA Negative Negative     Urobilinogen, POC UA 0.2 <=1.0     Nitrite, POC UA Negative Negative     WBC, POC UA Negative Negative            ASSESSMENT   1. Increased prostate specific antigen (PSA) velocity  POCT Urinalysis(Instrument)     POCT Bladder Scan       2. BPH with obstruction/lower urinary tract symptoms  POCT Urinalysis(Instrument)     POCT Bladder Scan                Plan  We had previously discussed at minimum lower tract evaluation to help guide BPH recommendations for intervention with cystoscopy and prostate ultrasound.  Given his history of borderline PSA, also had risk benefit discussion about prostate biopsy before proceeding with BPH intervention to verify no early diagnosis of underlying malignancy and verify BPH before proceeding.  Again reviewed soft indication, noting that MRI of the prostate was done, however given pacemaker and scheduling was done without contrast though does indicate a normal PSA density.  His prostate size of 65 g most likely supports his borderline PSA.  While free PSA is not greater than 30%, has been in the mid 20% range, which is quite normal for a PSA less than 4, as the value of free PSA is when the PSA is elevated between 4 in 10.  His PSA was elevated to 3.94 in the past, almost 4, however has been stable lately even with a rise from 3-3.7, now stable at 3.6 with 24% free PSA.  Discussed certainly it is reasonable to proceed with BPH workup only as his progressive  LUTS are more of a problem and long-term complications from continued obstruction will could be more bothersome at this point, and with a normal PSA density and these factors the risk of underlying malignancy is quite low, however also again noted with transrectal ultrasound, transrectal ultrasound-guided biopsy now off blood thinners at little risk and little time to the procedure, would prefer to have these diagnostics upfront before proceeding with BPH intervention, which is quite reasonable.   He elected to proceed with cystoscopy and prostate biopsy on 9/27/24.  ASC notify to pull from scheduling depot and placed on schedule.  Follow-up set one-week later to review pathology and lower tract findings to make further plans for his BPH and obstruction, pathology dependent.  Re ED discussed multifactorially etiologies, and management options. Prefers trial of on demand oral meds. Has been years.   Sildenafil Rx sent. Proper use discussed.

## 2024-09-28 NOTE — OP NOTE
[facility] Urology Operative/Brief Discharge Note     Date: 9/27/24     Staff Surgeon: Av Brantley MD     Pre-Op Diagnosis:   1. Elevated psa  2. BPH with LUTS     Post-Op Diagnosis:   same     Procedure(s) Performed:   1. Transrectal ultrasound guided prostate needle biopsy  2. Cystoscopy (flexible)     INDICATION FOR PROCEDURE:    Long history of BPH/LUTS and rising PSA velocity with borderline elevation to 3.94 in June of 2023, with previous testosterone replacement therapy history.  LUTS severe despite alpha-blocker, and PSA density normal via outside MRI which was done without contrast, but with progressive LUTS and interest in BPH intervention, with his PSA velocity rise and borderline elevation with prior testosterone use, discuss prostate biopsy to rule out any malignancy before BPH intervention.      ANESTHESIA: Local periprostatic block; 10 cc 2% lidocaine, and urojet 2% xylocaine per urethra     PSA: 3.6 (24.17% free)     TRUS VOLUME: 68.02 (W 54.77mm, H 35.53mm, L 66.82mm)     EBL: Minimal     SPECIMEN:  14 core prostate biopsy - right and left base, middle, apex - medial and lateral of each, and bilateral TZ cores     ULTRASOUND FINDINGS: scattered hypodensities and calcifications, with more discreet hypoechoic area at left apex     CYSTO FINDINGS:   Significant kissing lateral lobe obstruction proximally and distally, without significant elevation of bladder neck, no median lobe, no significant intravesical extension just mild circumferential impression of prostate at bladder base of which lateral lobe obstruction could be seen on retroflexion at the outlet.  On withdrawal of scope, lateral lobe ingrowth and obstruction seen almost immediately upon withdrawing from bladder neck, and throughout the prostatic urethra.  Hypervascular. Moderately trabeculated bladder.     CONFIRMED PATIENT TOOK ANTIBIOTICS: Yes     CONFIRMED PATIENT NOT TAKING ASPIRIN OR ANTICOAGULANTS: Yes     CONFIRMED PATIENT USED  ENEMA: Yes     PROCEDURE IN DETAIL:  After informed consent, the patient was prepped and drapped in standard cystoscopic fashion and 2% xylocaine jelly was instilled into the urethra. 80mg gentamicin injected IM.     First, a flexible cystoscope was passed into the bladder via the urethra.   Anterior urethra normal. The prostatic urethra demonstrated findings as above with significant lateral lobe obstruction in the absence of median lobe. The ureteral orifices were  in the orthotopic position bilaterally on the trigone. The bladder mucosa, including the lateral walls, posterior wall, and dome were free of any lesions or tumors. Signs of obstruction as noted above. Flexible cystoscope then removed.      Patient voided into urinal, and was then turned to the left lateral position and TRUS probe inserted into rectum. Ultrasound measurements taken as above and ultrasound of prostate performed with findings as above. Noted to empty bladder well based on US. Approximately 10cc of 1% lidocaine injected bilaterally in sheldon-prostatic block fashion, as well as at the apex.   12 total core biopies taken were taken in a sextant fashion, with addition of TZ cores. Additional cores targeting mri abnormalities as noted. Patient tolerated well without complication.     CONDITION: Stable     DISHARGE:  Status post uncomplicated outpatient procedure as above.   Disposition: Home.  He will follow up in 2 weeks for biopsy results.   Resume regular diet  FU - 1-2 weeks to discuss pathology  Return to ER if temp >101, uncontrollable urethral or rectal bleeding, or inability to urinate/urinary retention  No sex/ejaculation x3 days, no riding mowers/tractors/bikes x2-4 weeks  Drink plenty of water may see blood in urine

## 2024-09-30 VITALS
BODY MASS INDEX: 36.32 KG/M2 | WEIGHT: 274.06 LBS | TEMPERATURE: 98 F | OXYGEN SATURATION: 95 % | DIASTOLIC BLOOD PRESSURE: 65 MMHG | RESPIRATION RATE: 19 BRPM | HEART RATE: 61 BPM | HEIGHT: 73 IN | SYSTOLIC BLOOD PRESSURE: 123 MMHG

## 2024-10-02 NOTE — PROGRESS NOTES
[facility] Urology Progress Note    Felipe Rogers is a 59 y.o. male who presents for fu BPH with rising psa velocity, s/p cystoscopy and prostate biopsy    Initial eval in 2023 for rising psa velocity and borderline elevation  PSA Hiastory: 21: 2.25; 22: 3.42; 23: 3.94  Lab review also notes borderline low VitD (29.15 on 23, from 23.34 on 22)  He was previously taking testosterone replacement 200mg IM Q2 weeks, as well as moujaro and vit D supplementation  He is on flomax for LUTS 0.4mg nightly since 2023. Stopped it about 1 month ago as was having orthostasis and had cards adjust PM settings but now back to baseline settings. Pacemaker for bradycardia . Dr Branch.  Tried daily cialis prior bc also mild ed but no real improvement in luts  Testosterone off and on for years. Stopped when psa went up 6 mos ago. Has been off   Wasn't taking it regularly anyways before - injected about every 6 weeks not every 2 weeks.   No known history of uti prostatitis.   Father  early 50s - so no known CaP in family.   AUA symptom score 17/4 (4: Weak stream; 3: Emptying, intermittency, straining; 2: Frequency; 1: Urgency, sleeping)  Flomax helps 7/10 and felt that he was voiding better on it, but discontinued it as above.  Cscope at 51, and had another with 12mm polyp and is on 3 yr plan.  No hematuria. Is on tractor often has seen brown urine but also with poor fluid intake     2023   Repeat psa 23 was 3.0 (23% free)  MRI of the prostate was done (though without contrast) at [facility] on 23 noting the prostate gland is 65 g at 5.7 x 4.7 x 4.6 cm and negative/PI-rad 2 only.  Tried uroxatral and didn't see much difference on flomax vs uroxatral so stayed on uroxatral   AUA symptom score 15/4, mostly satisfied (4: Weak stream; 3: Urgency; 2: Emptying, frequency, straining; 1: Intermittency, sleeping).  Medication helps 4/10.    Postvoid residual by bladder scan is 21 cc.   Urinalysis dipstick is negative.    urgency has been progressive.  No significant bothersome frequency during the day.  When the urge comes on, he can always make it to the bathroom no urge incontinence.    But this has been progressive.     In interim after last visit continued Uroxatral and plan lower tract evaluation with cysto/truss in January, and discuss repeating MRI with contrast however due to pacemaker issues did not do so, after risk benefit discussion, and then ultimately postpone procedures secondary to ankle fracture DVT/PE/blood thinners.    8/16/24:  No longer on bloodthinners. Off since may. Had interim DVT/PE from ankle fracture.  Only ever did TRT a few months years ago. Didn't seer much benefit  LUTS seemed to be progressing even despite starting medication at last visit.  Previously was taken off Flomax by Cardiology due to blood pressure concerns.  Daily Cialis was not improving LUTS nor ED, and started Uroxatral last visit.    AUA symptom score today is 14/5, unhappy (3: Frequency, urgency; 2: Emptying, weak stream, straining; 1: Intermittency, sleeping) medication helps 4/10. Notes prolonged voiding times.  A bit more urinary frequency.  Daytime frequency at least 8-10 times.  Does drink tea throughout the day but has limited fluid intake overall due to his urinary frequency. Has had some close urgency lately which is new.  Motorcylce/riding mowers  Baseline ED - viagra in past worked better than cialis with headache. Daily cialis no benefit.  Recent wt gain after ankle fx    Cysto/TRUS-biopsy 9/27/24  PSA: 3.6 (24.17% free);TRUS VOLUME: 68.02 (W 54.77mm, H 35.53mm, L 66.82mm)  SPECIMEN:  14 core prostate biopsy - right and left base, middle, apex - medial and lateral of each, and bilateral TZ cores   ULTRASOUND FINDINGS: scattered hypodensities and calcifications, with more discreet hypoechoic area at left apex  CYSTO FINDINGS: Significant kissing lateral lobe obstruction proximally and  "distally, without significant elevation of bladder neck, no median lobe, no significant intravesical extension just mild circumferential impression of prostate at bladder base of which lateral lobe obstruction could be seen on retroflexion at the outlet.  On withdrawal of scope, lateral lobe ingrowth and obstruction seen almost immediately upon withdrawing from bladder neck, and throughout the prostatic urethra.  Hypervascular. Moderately trabeculated bladder.  PATH: 1/14 cores+ G6  3. RIGHT MID LATERAL: - PROSTATIC ADENOCARCINOMA, RAQUEL SCORE 3+3=6 (GRADE GROUP 1) - LESS THAN 1 MM (5% OF SUBMITTED TISSUE), 1 OF 1 CORE     He returns today with wife to review pathology and next steps  Did well after biopsy  No fevers chills or concerns  Symptom profile has progressed since above with weaker stream and more urgency, bordering on urge incontinence.  Only used testosterone replacement very briefly many years ago.  Has been years since use        Focused Physical Exam:    Vitals:    10/04/24 1106   BP: (!) 151/88   Pulse: (!) 56     Body mass index is 36.15 kg/m². Weight: 124.3 kg (274 lb 0.5 oz) Height: 6' 1" (185.4 cm)     Abdomen: Soft, non-tender, nondistended, no CVA tenderness    Recent Results (from the past 2 weeks)   POCT Urinalysis (Instrument)    Collection Time: 09/27/24  6:53 AM   Result Value Ref Range    Color, POC UA Yellow Yellow, Straw, Colorless    Clarity, POC UA Clear Clear    Glucose, POC UA Negative Negative    Bilirubin, POC UA Negative Negative    Ketones, POC UA Negative Negative    Spec Grav POC UA >=1.030 1.005 - 1.030    Blood, POC UA Negative Negative    pH, POC UA 5.5 5.0 - 8.0    Protein, POC UA Negative Negative    Urobilinogen, POC UA 0.2 <=1.0    Nitrite, POC UA Negative Negative    WBC, POC UA Trace (A) Negative       ASSESSMENT   1. Prostate cancer        2. BPH with obstruction/lower urinary tract symptoms  Procedure Order to Urology          Plan    I sat with the patient and his " "wife and explained in detail his diagnosis of prostate cancer, including explanation of enzo grading system, and went over all the treatment options for management of his prostate cancer. The treatment options include detailed discussions of surveillance, radiation treatment including external beam as well as brachytherapy, and surgical extirpative therapy namely robotic prostatectomy. We did discuss that given his single core low volume Baldwin 6 CaP at low risk,  that he is a candidate for active surveillance, and in fact meets criteria for "very low risk disease" with <1/3 cores+, <50% involvement of core, and PSAD <0.15, and thus active surveillance is recommended. We did also discuss risks and benefits of radiation and surgery  in detail and I answered many questions about various aspects of both options, of which I answered radiation related questions to the best of my ability.     We discussed radical prostatectomy. The procedure in general including hospital stay and postoperative process were discussed in detail. Risks of surgery were discussed including but not limited to up-front urinary incontinence and erectile dysfunction which we will work to overcome with kegel excercises and any number of ED therapies, versus side effects of radiation which are often minimal and irritative upfront with more troubling complications such as stricture and radiation cystitis occurring late. We also briefly discussed psa monitoring post-treatment and had brief discussion about psa recurrence, noting radiation could be used as salvage therapy after surgery but not often vice versa. We discussed concurrent pelvic lymphadenectomy with surgery, and that sometimes lymph nodes are included in radiation fields dependent on risk. Also discussed concurrent use of ADT with radiation and its side effects such as fatigue, hot flashes, ED.     Active surveillance involves following PSA every 6 months, often updating biopsy at 1 " year targeting areas of previous positivity, and then alternating evaluations with MRI and biopsy, spacing these evaluations 2-3 years in the case of stable PSA and evaluating sooner in the case of PSA rise.  Certainly in his case he did have an MRI which has normal PSA density and no distinct concerns, but was only a non contrasted MRI, and therefore a PSA remained stable at 1 year could update MRI with true with and without contrast protocol and as long as no concerns and PSA stable, defer biopsy to later and surveillance with PSA rises.    Given his very low risk prostate cancer, yet his significant prostate obstruction and significant symptomatic BPH/LUTS refractory to medical management (couldn't tolerate flomax due to side effects and has been on alfuzosin > 1 yr with progressive symptoms, with signs of chronic obstruction of the bladder and bothersome LUTS despite increasing dose of alpha blockers, we did have risk benefit discussion about minimally invasive BPH intervention while pursuing active surveillance for prostate cancer verses prostatectomy which would yield resolution of both prostate cancer and prostate obstruction, albeit at the expense of risks of surgery and his postprostatectomy continence recovery. As well had dvt/pe in last year - now off bloodthinners, but would increase risk (though ppx measures would be taken). We did briefly discuss minimally invasive/robotic prostatectomy, and a pamphlet on robotic prostatectomy was provided. Specifics to surgical procedure, hospital stay, and recovery were discussed.  However also reviewed in detail that more at risk to him right now is his outlet obstruction, and minimally invasive intervention such as with UroLift, would not impact future surveillance or treatment of prostate cancer. I did provide NCC[facility] prostate cancer patient guideline booklet for review, marking the very low risk page for him     He has moderate prostatomegaly and significant lateral  "lobe obstruction in the absence of median lobe or intravesical extension and moderate to severe despite alpha blockers, for which symptoms have been progressive on and he would like to discontinue. Discussed options for intervention such as urolift, rezum, turp, and aquablation. Risks benefits postop protocols and side effect profiles of these urologic procedures, as well as potential future impacts on possible treatment for prostate cancer if needed.    He is most interested in proceeding with Urolift after discussion of all risks, benefits, and alternatives. Especially given minimal risk profile and lack of sexual side effects, as well as not destroying any prostate tissue and having minimal impact on future evaluation for, and potential for treatment if need, of prostate cancer in the future. We reviewed the implants are MRI safe and only create a 5 mm artifact at the implant site, thus can continue to MRI. As well, should disease progress and he elect to undergo treatment, would not impact the treatment either by radiation or prostatectomy, as can serve as fiducial markers for radiotherapy, and are seated within the prostate capsule and do not affect the ability to perform prostatectomy, even nerve-sparing prostatectomy.  They are just removed with the prostate. In interim however, would serve to relieve his obstructive lower urinary tract symptoms, eliminate the need for medical therapy, and protect bladder from progressive obstructive damage.    - No allergy to nickel, no UTI or prostatitis history. No median lobe. Gland size 65g by MRI, 68g by TRUS. Clinically significant prostate cancer ruled out via workup above (cT1c, neg mri, 1 core positive in "very low risk" category)     Procedure in detail discussed. Discussed risks including but not limited to hematuria, postop retention, pain, infection, injury to bladder or urethra, and worsening urgency, latent pelvic pain, no guarantee of symptomatic relief, " prostate regrowth, need for future procedures. We did discuss the non-incidence of ejaculatory dysfunction or erectile dysfunction, as well as the 13% recurrence risk in 5 years. Also discussed about 20% of people may need a catheter after (due to retention or hematuria) but not required if voiding postop, though tend to leave petit prophylactically at least overnight, which he is agreeable to.  Discussed that symptomatic relief may take longer to be fully appreciated, in the setting of longer standing obstruction, and period of symptomatic adjustment postop. Also discussed transient increases in urgency frequency which may yield transient UUI in postop period.     All questions patient and wife had answered and appropriate informed consent obtained.    UroLift scheduled in the OR on 11/21/24 per patient request.    Will continue active surveillance from there and coordinate his post UroLift LUTS follow-ups with appropriate timeline active surveillance (as next PSA will be due in 6 mos)  As well, when continuing active surveillance, discuss the value of genetic analysis in algorithm and treatment decisions.  There are few, some which differentiate the 10 year metastasis risk if treated or not treated, but in his case would benefit from utilization of Oncotype DX to assess risks of adverse pathology, as if tumor biology was demonstrative of concern for significant clinical progression, pursuing treatment may be prudent.  Otherwise, all data and imaging so far with his clinical T1c very low risk disease suggest that were prostate cancer standpoint he was safe, and more risk from his outlet obstruction thus plans to manage it (especially in a manner that will not impact future treatment, if needed)  Will seek cardiac clearance from Dr Branch given cardiac history and pacemaker - though on review was last seen on 8/26/24 noting stable and f/u in 1 yr     Total time spent in/on encounter today, including face to face  time with patient, counseling, medical record review, interpretation of tests/results, , and treatment plan coordination: 85 minutes

## 2024-10-04 ENCOUNTER — OFFICE VISIT (OUTPATIENT)
Dept: UROLOGY | Facility: CLINIC | Age: 59
End: 2024-10-04
Payer: COMMERCIAL

## 2024-10-04 VITALS
HEART RATE: 56 BPM | HEIGHT: 73 IN | SYSTOLIC BLOOD PRESSURE: 151 MMHG | BODY MASS INDEX: 36.32 KG/M2 | WEIGHT: 274.06 LBS | DIASTOLIC BLOOD PRESSURE: 88 MMHG

## 2024-10-04 DIAGNOSIS — N13.8 BPH WITH OBSTRUCTION/LOWER URINARY TRACT SYMPTOMS: ICD-10-CM

## 2024-10-04 DIAGNOSIS — N40.0 BPH (BENIGN PROSTATIC HYPERPLASIA): ICD-10-CM

## 2024-10-04 DIAGNOSIS — N40.1 BPH WITH OBSTRUCTION/LOWER URINARY TRACT SYMPTOMS: ICD-10-CM

## 2024-10-04 DIAGNOSIS — C61 PROSTATE CANCER: Primary | ICD-10-CM

## 2024-10-04 DIAGNOSIS — C61 PROSTATE CANCER: ICD-10-CM

## 2024-10-04 PROCEDURE — 99417 PROLNG OP E/M EACH 15 MIN: CPT | Mod: S$GLB,,, | Performed by: UROLOGY

## 2024-10-04 PROCEDURE — 3077F SYST BP >= 140 MM HG: CPT | Mod: CPTII,S$GLB,, | Performed by: UROLOGY

## 2024-10-04 PROCEDURE — 1159F MED LIST DOCD IN RCRD: CPT | Mod: CPTII,S$GLB,, | Performed by: UROLOGY

## 2024-10-04 PROCEDURE — 3008F BODY MASS INDEX DOCD: CPT | Mod: CPTII,S$GLB,, | Performed by: UROLOGY

## 2024-10-04 PROCEDURE — 99999 PR PBB SHADOW E&M-EST. PATIENT-LVL III: CPT | Mod: PBBFAC,,, | Performed by: UROLOGY

## 2024-10-04 PROCEDURE — 3079F DIAST BP 80-89 MM HG: CPT | Mod: CPTII,S$GLB,, | Performed by: UROLOGY

## 2024-10-04 PROCEDURE — 99215 OFFICE O/P EST HI 40 MIN: CPT | Mod: S$GLB,,, | Performed by: UROLOGY

## 2024-10-04 RX ORDER — CEFAZOLIN SODIUM 2 G/50ML
2 SOLUTION INTRAVENOUS
OUTPATIENT
Start: 2024-10-04

## 2024-10-04 NOTE — Clinical Note
1. Send onctoype dx  2. Send to dr dangelo requesting cardiac clearance for urolift in or 11/21 under brief general lma anesthesia (last o/v 8/26 noted stable fu 1 yr, but is any further testing or visit needed for preop clearance)

## 2024-10-04 NOTE — PROGRESS NOTES
Procedure Order to Urology [[id number]]    Electronically signed by: Av Brantley MD on 10/04/24 1204 Status: Active   Ordering user: Av Brantley MD 10/04/24 1204 Authorized by: vA Brantley MD   Ordering mode: Standard   Frequency:  10/04/24 -     Diagnoses  BPH with obstruction/lower urinary tract symptoms [N40.1, N13.8]   Questionnaire    Question Answer   Procedure Urolift Comment - 11/21   Facility Name: [facility], please order, CBC, BMP, PT/ INR ,U/A and culture ,EKG if over 40  IV start, NPO, general anesthesia, Ancef 2 gram ( alternative for pcn allergy is Cipro 400mg IV ) [id number] and [id number]

## 2024-10-14 ENCOUNTER — TELEPHONE (OUTPATIENT)
Dept: UROLOGY | Facility: CLINIC | Age: 59
End: 2024-10-14
Payer: COMMERCIAL

## 2024-10-25 ENCOUNTER — PATIENT MESSAGE (OUTPATIENT)
Dept: UROLOGY | Facility: CLINIC | Age: 59
End: 2024-10-25
Payer: COMMERCIAL

## 2024-11-04 ENCOUNTER — PATIENT MESSAGE (OUTPATIENT)
Dept: RESEARCH | Facility: HOSPITAL | Age: 59
End: 2024-11-04
Payer: COMMERCIAL

## 2024-11-05 ENCOUNTER — PATIENT MESSAGE (OUTPATIENT)
Dept: RESEARCH | Facility: HOSPITAL | Age: 59
End: 2024-11-05
Payer: COMMERCIAL

## 2024-11-08 ENCOUNTER — HOSPITAL ENCOUNTER (OUTPATIENT)
Dept: PREADMISSION TESTING | Facility: HOSPITAL | Age: 59
Discharge: HOME | End: 2024-11-08
Attending: UROLOGY
Payer: COMMERCIAL

## 2024-11-08 VITALS — BODY MASS INDEX: 35.39 KG/M2 | WEIGHT: 267 LBS | HEIGHT: 73 IN

## 2024-11-08 DIAGNOSIS — N40.1 BPH WITH OBSTRUCTION/LOWER URINARY TRACT SYMPTOMS: ICD-10-CM

## 2024-11-08 DIAGNOSIS — Z01.810 PREOP CARDIOVASCULAR EXAM: ICD-10-CM

## 2024-11-08 DIAGNOSIS — N13.8 BPH WITH OBSTRUCTION/LOWER URINARY TRACT SYMPTOMS: ICD-10-CM

## 2024-11-08 PROCEDURE — 93010 ELECTROCARDIOGRAM REPORT: CPT | Mod: ,,, | Performed by: GENERAL PRACTICE

## 2024-11-08 PROCEDURE — 93005 ELECTROCARDIOGRAM TRACING: CPT

## 2024-11-08 NOTE — DISCHARGE INSTRUCTIONS
To confirm, Your doctor has instructed you that surgery is scheduled for:   11-    [facility] the morning of surgery. You must check-in and receive a wristband before going to your procedure.  [address] [place] [address] [place] [address]    Pre-Op will call the afternoon prior to surgery between 1:00 and 6:00 PM with the final arrival time.  Phone number: [phone]    PLEASE NOTE:  The surgery schedule has many variables which may affect the time of your surgery case.  Family members should be available if your surgery time changes.  Plan to be here the day of your procedure between 4-6 hours.    MEDICATIONS:  TAKE ONLY THESE MEDICATIONS WITH A SMALL SIP OF WATER THE MORNING OF YOUR PROCEDURE:    SEE LIST      DO NOT TAKE THESE MEDICATIONS 5-7 DAYS PRIOR to your procedure or per your surgeon's request:   ASPIRIN, ALEVE, ADVIL, IBUPROFEN, FISH OIL VITAMIN E, HERBALS  (May take Tylenol)    ONLY if you are prescribed any types of blood thinners such as:  Aspirin, Coumadin, Plavix, Pradaxa, Xarelto, Aggrenox, Effient, Eliquis, Savasya, Brilinta, or any other, ask your surgeon whether you should stop taking them and how long before surgery you should stop.  You may also need to verify with the prescribing physician if it is ok to stop your medication.      INSTRUCTIONS IMPORTANT!!  Do not eat or drink anything between midnight and the time of your procedure- this includes gum, mints, and candy.  EXCEPT: you may have clear liquids such as:  WATER, BLACK COFFEE, UNSWEET TEA, OR GATORADE (NO RED OR PURPLE) UP TO 2 HOURS PRIOR TO YOUR ARRIVAL TIME.  Do not smoke or drink alcoholic beverages 24 hours prior to your procedure.  Shower the night before AND the morning of your procedure with a Chlorhexidine wash such as Hibiclens or Dial antibacterial soap from the neck down.  Do not get it on your face or in your eyes.  You may use your own shampoo and face wash. This  helps your skin to be as bacteria free as possible.    If you wear contact lenses, dentures, hearing aids or glasses, bring a container to put them in during surgery and give to a family member for safe keeping.  Please leave all jewelry, piercing's and valuables at home. You must remove your false eyelashes prior to surgery.    DO NOT remove hair from the surgery site.  Do not shave the incision site unless you are given specific instructions to do so.    ONLY if you have been diagnosed with sleep apnea please bring your C-PAP machine.  ONLY if you wear home oxygen please bring your portable oxygen tank the day of your procedure.  ONLY if you have a history of OPEN HEART SURGERY you will need a clearance from your Cardiologist per Anesthesia.      ONLY for patients requiring bowel prep, written instructions will be given by your doctor's office.  ONLY if you have a neuro stimulator, please bring the controller with you the morning of surgery  ONLY if a type and screen test is needed before surgery, please return:  If your doctor has scheduled you for an overnight stay, bring a small overnight bag with any personal items you need.  Make arrangements in advance for transportation home by a responsible adult. You can not go home in an uber or a cab per hospital policy.  It is not safe to drive a vehicle during the 24 hours after anesthesia.          All  facilities and properties are tobacco free.  Smoking is NOT allowed.   If you have any questions about these instructions, call Pre-Op Admit  Nursing at [phone] or the Pre-Op Day Surgery Unit at [phone].

## 2024-11-10 LAB
OHS QRS DURATION: 102 MS
OHS QTC CALCULATION: 375 MS

## 2024-11-20 ENCOUNTER — ANESTHESIA EVENT (OUTPATIENT)
Dept: SURGERY | Facility: HOSPITAL | Age: 59
End: 2024-11-20
Payer: COMMERCIAL

## 2024-11-21 ENCOUNTER — HOSPITAL ENCOUNTER (OUTPATIENT)
Facility: HOSPITAL | Age: 59
Discharge: HOME | End: 2024-11-21
Attending: UROLOGY | Admitting: UROLOGY
Payer: COMMERCIAL

## 2024-11-21 ENCOUNTER — ANESTHESIA (OUTPATIENT)
Dept: SURGERY | Facility: HOSPITAL | Age: 59
End: 2024-11-21
Payer: COMMERCIAL

## 2024-11-21 DIAGNOSIS — N40.0 BPH (BENIGN PROSTATIC HYPERPLASIA): ICD-10-CM

## 2024-11-21 DIAGNOSIS — N13.8 BPH WITH OBSTRUCTION/LOWER URINARY TRACT SYMPTOMS: ICD-10-CM

## 2024-11-21 DIAGNOSIS — N40.1 BPH WITH OBSTRUCTION/LOWER URINARY TRACT SYMPTOMS: ICD-10-CM

## 2024-11-21 PROCEDURE — 63600175 PHARM REV CODE 636 W HCPCS: Mod: JZ,JG | Performed by: NURSE ANESTHETIST, CERTIFIED REGISTERED

## 2024-11-21 PROCEDURE — 37000009 HC ANESTHESIA EA ADD 15 MINS: Performed by: UROLOGY

## 2024-11-21 PROCEDURE — 52442 CYSTO INS TRNSPRSTC IMPLT EA: CPT | Mod: ,,, | Performed by: UROLOGY

## 2024-11-21 PROCEDURE — 94799 UNLISTED PULMONARY SVC/PX: CPT

## 2024-11-21 PROCEDURE — L8699 PROSTHETIC IMPLANT NOS: HCPCS | Performed by: UROLOGY

## 2024-11-21 PROCEDURE — 71000015 HC POSTOP RECOV 1ST HR: Performed by: UROLOGY

## 2024-11-21 PROCEDURE — 71000033 HC RECOVERY, INTIAL HOUR: Performed by: UROLOGY

## 2024-11-21 PROCEDURE — 52441 CYSTO INSJ TRNSPRSTC 1 IMPLT: CPT | Mod: ,,, | Performed by: UROLOGY

## 2024-11-21 PROCEDURE — 71000039 HC RECOVERY, EACH ADD'L HOUR: Performed by: UROLOGY

## 2024-11-21 PROCEDURE — 36000707: Performed by: UROLOGY

## 2024-11-21 PROCEDURE — 37000008 HC ANESTHESIA 1ST 15 MINUTES: Performed by: UROLOGY

## 2024-11-21 PROCEDURE — 63600175 PHARM REV CODE 636 W HCPCS: Performed by: UROLOGY

## 2024-11-21 PROCEDURE — 25000003 PHARM REV CODE 250: Performed by: NURSE ANESTHETIST, CERTIFIED REGISTERED

## 2024-11-21 PROCEDURE — 63600175 PHARM REV CODE 636 W HCPCS: Performed by: ANESTHESIOLOGY

## 2024-11-21 PROCEDURE — 71000016 HC POSTOP RECOV ADDL HR: Performed by: UROLOGY

## 2024-11-21 PROCEDURE — 25000003 PHARM REV CODE 250: Performed by: ANESTHESIOLOGY

## 2024-11-21 PROCEDURE — 36000706: Performed by: UROLOGY

## 2024-11-21 DEVICE — SYS UROLIFT ADV TISSUE CONTROL: Type: IMPLANTABLE DEVICE | Site: PROSTATE | Status: FUNCTIONAL

## 2024-11-21 DEVICE — CARTRIDGE UROLIFT 2 IMPLANT: Type: IMPLANTABLE DEVICE | Site: PROSTATE | Status: FUNCTIONAL

## 2024-11-21 RX ORDER — LIDOCAINE HYDROCHLORIDE 10 MG/ML
1 INJECTION, SOLUTION EPIDURAL; INFILTRATION; INTRACAUDAL; PERINEURAL ONCE
Status: DISCONTINUED | OUTPATIENT
Start: 2024-11-21 | End: 2024-11-21 | Stop reason: HOSPADM

## 2024-11-21 RX ORDER — PHENAZOPYRIDINE HYDROCHLORIDE 200 MG/1
200 TABLET, FILM COATED ORAL 3 TIMES DAILY PRN
Qty: 15 TABLET | Refills: 0 | Status: SHIPPED | OUTPATIENT
Start: 2024-11-21 | End: 2024-12-01

## 2024-11-21 RX ORDER — MIDAZOLAM HYDROCHLORIDE 1 MG/ML
INJECTION INTRAMUSCULAR; INTRAVENOUS
Status: DISCONTINUED | OUTPATIENT
Start: 2024-11-21 | End: 2024-11-21

## 2024-11-21 RX ORDER — ALFUZOSIN HYDROCHLORIDE 10 MG/1
10 TABLET, EXTENDED RELEASE ORAL NIGHTLY
Qty: 14 TABLET | Refills: 0 | Status: SHIPPED | OUTPATIENT
Start: 2024-11-21 | End: 2024-12-05

## 2024-11-21 RX ORDER — ONDANSETRON HYDROCHLORIDE 2 MG/ML
INJECTION, SOLUTION INTRAMUSCULAR; INTRAVENOUS
Status: DISCONTINUED | OUTPATIENT
Start: 2024-11-21 | End: 2024-11-21

## 2024-11-21 RX ORDER — FENTANYL CITRATE 50 UG/ML
25 INJECTION, SOLUTION INTRAMUSCULAR; INTRAVENOUS EVERY 5 MIN PRN
Status: COMPLETED | OUTPATIENT
Start: 2024-11-21 | End: 2024-11-21

## 2024-11-21 RX ORDER — ACETAMINOPHEN 10 MG/ML
INJECTION, SOLUTION INTRAVENOUS
Status: DISCONTINUED | OUTPATIENT
Start: 2024-11-21 | End: 2024-11-21

## 2024-11-21 RX ORDER — OXYCODONE HYDROCHLORIDE 5 MG/1
5 TABLET ORAL ONCE AS NEEDED
Status: COMPLETED | OUTPATIENT
Start: 2024-11-21 | End: 2024-11-21

## 2024-11-21 RX ORDER — CEFAZOLIN 2 G/1
2 INJECTION, POWDER, FOR SOLUTION INTRAMUSCULAR; INTRAVENOUS
Status: COMPLETED | OUTPATIENT
Start: 2024-11-21 | End: 2024-11-21

## 2024-11-21 RX ORDER — METHYLPREDNISOLONE 4 MG/1
TABLET ORAL
Qty: 21 EACH | Refills: 0 | Status: SHIPPED | OUTPATIENT
Start: 2024-11-21 | End: 2024-12-12

## 2024-11-21 RX ORDER — SODIUM CHLORIDE, SODIUM LACTATE, POTASSIUM CHLORIDE, CALCIUM CHLORIDE 600; 310; 30; 20 MG/100ML; MG/100ML; MG/100ML; MG/100ML
INJECTION, SOLUTION INTRAVENOUS CONTINUOUS
Status: DISCONTINUED | OUTPATIENT
Start: 2024-11-21 | End: 2024-11-21 | Stop reason: HOSPADM

## 2024-11-21 RX ORDER — PROPOFOL 10 MG/ML
VIAL (ML) INTRAVENOUS
Status: DISCONTINUED | OUTPATIENT
Start: 2024-11-21 | End: 2024-11-21

## 2024-11-21 RX ORDER — FENTANYL CITRATE 50 UG/ML
INJECTION, SOLUTION INTRAMUSCULAR; INTRAVENOUS
Status: DISCONTINUED | OUTPATIENT
Start: 2024-11-21 | End: 2024-11-21

## 2024-11-21 RX ORDER — SULFAMETHOXAZOLE AND TRIMETHOPRIM 800; 160 MG/1; MG/1
1 TABLET ORAL 2 TIMES DAILY
Qty: 10 TABLET | Refills: 0 | Status: SHIPPED | OUTPATIENT
Start: 2024-11-21 | End: 2024-11-26

## 2024-11-21 RX ORDER — LIDOCAINE HYDROCHLORIDE 20 MG/ML
INJECTION INTRAVENOUS
Status: DISCONTINUED | OUTPATIENT
Start: 2024-11-21 | End: 2024-11-21

## 2024-11-21 RX ORDER — DEXMEDETOMIDINE HYDROCHLORIDE 100 UG/ML
INJECTION, SOLUTION INTRAVENOUS
Status: DISCONTINUED | OUTPATIENT
Start: 2024-11-21 | End: 2024-11-21

## 2024-11-21 RX ORDER — DEXAMETHASONE SODIUM PHOSPHATE 4 MG/ML
INJECTION, SOLUTION INTRA-ARTICULAR; INTRALESIONAL; INTRAMUSCULAR; INTRAVENOUS; SOFT TISSUE
Status: DISCONTINUED | OUTPATIENT
Start: 2024-11-21 | End: 2024-11-21

## 2024-11-21 RX ORDER — METOCLOPRAMIDE HYDROCHLORIDE 5 MG/ML
10 INJECTION INTRAMUSCULAR; INTRAVENOUS EVERY 10 MIN PRN
Status: DISCONTINUED | OUTPATIENT
Start: 2024-11-21 | End: 2024-11-21 | Stop reason: HOSPADM

## 2024-11-21 RX ORDER — EPHEDRINE SULFATE 50 MG/ML
INJECTION, SOLUTION INTRAVENOUS
Status: DISCONTINUED | OUTPATIENT
Start: 2024-11-21 | End: 2024-11-21

## 2024-11-21 RX ADMIN — DEXAMETHASONE SODIUM PHOSPHATE 8 MG: 4 INJECTION, SOLUTION INTRA-ARTICULAR; INTRALESIONAL; INTRAMUSCULAR; INTRAVENOUS; SOFT TISSUE at 08:11

## 2024-11-21 RX ADMIN — ONDANSETRON 4 MG: 2 INJECTION INTRAMUSCULAR; INTRAVENOUS at 07:11

## 2024-11-21 RX ADMIN — MIDAZOLAM HYDROCHLORIDE 2 MG: 1 INJECTION, SOLUTION INTRAMUSCULAR; INTRAVENOUS at 07:11

## 2024-11-21 RX ADMIN — EPHEDRINE SULFATE 10 MG: 50 INJECTION, SOLUTION INTRAMUSCULAR; INTRAVENOUS; SUBCUTANEOUS at 08:11

## 2024-11-21 RX ADMIN — DEXMEDETOMIDINE HYDROCHLORIDE 4 MCG: 100 INJECTION, SOLUTION INTRAVENOUS at 07:11

## 2024-11-21 RX ADMIN — FENTANYL CITRATE 50 MCG: 0.05 INJECTION, SOLUTION INTRAMUSCULAR; INTRAVENOUS at 08:11

## 2024-11-21 RX ADMIN — ONDANSETRON 4 MG: 2 INJECTION INTRAMUSCULAR; INTRAVENOUS at 09:11

## 2024-11-21 RX ADMIN — CEFAZOLIN 2 G: 2 INJECTION, POWDER, FOR SOLUTION INTRAMUSCULAR; INTRAVENOUS at 07:11

## 2024-11-21 RX ADMIN — LIDOCAINE HYDROCHLORIDE 100 MG: 20 INJECTION, SOLUTION INTRAVENOUS at 08:11

## 2024-11-21 RX ADMIN — FENTANYL CITRATE 25 MCG: 50 INJECTION, SOLUTION INTRAMUSCULAR; INTRAVENOUS at 09:11

## 2024-11-21 RX ADMIN — OXYCODONE 5 MG: 5 TABLET ORAL at 09:11

## 2024-11-21 RX ADMIN — FENTANYL CITRATE 50 MCG: 0.05 INJECTION, SOLUTION INTRAMUSCULAR; INTRAVENOUS at 09:11

## 2024-11-21 RX ADMIN — SODIUM CHLORIDE, SODIUM GLUCONATE, SODIUM ACETATE, POTASSIUM CHLORIDE AND MAGNESIUM CHLORIDE: 526; 502; 368; 37; 30 INJECTION, SOLUTION INTRAVENOUS at 07:11

## 2024-11-21 RX ADMIN — ACETAMINOPHEN 1000 MG: 10 INJECTION INTRAVENOUS at 08:11

## 2024-11-21 RX ADMIN — SODIUM CHLORIDE, SODIUM GLUCONATE, SODIUM ACETATE, POTASSIUM CHLORIDE AND MAGNESIUM CHLORIDE: 526; 502; 368; 37; 30 INJECTION, SOLUTION INTRAVENOUS at 11:11

## 2024-11-21 RX ADMIN — PROPOFOL 200 MG: 10 INJECTION, EMULSION INTRAVENOUS at 08:11

## 2024-11-21 NOTE — ANESTHESIA PROCEDURE NOTES
Intubation    Date/Time: 11/21/2024 8:07 AM    Performed by: Deion Bai CRNA  Authorized by: Jeronimo Chong MD    Intubation:     Induction:  Intravenous    Intubated:  Postinduction    Mask Ventilation:  Easy mask    Attempts:  1    Attempted By:  CRNA    Difficult Airway Encountered?: No      Complications:  None    Airway Device:  Supraglottic airway/LMA    Airway Device Size:  5.0    Style/Cuff Inflation:  Cuffed (inflated to minimal occlusive pressure)    Placement Verified By:  Capnometry    Complicating Factors:  None    Findings Post-Intubation:  BS equal bilateral

## 2024-11-21 NOTE — DISCHARGE INSTRUCTIONS
"Discharge Instructions: After Your Surgery/Procedure  Youve just had surgery. During surgery you were given medicine called anesthesia to keep you relaxed and free of pain. After surgery you may have some pain or nausea. This is common. Here are some tips for feeling better and getting well after surgery.     Stay on schedule with your medication.   Going home  Your doctor or nurse will show you how to take care of yourself when you go home. He or she will also answer your questions. Have an adult family member or friend drive you home.      For your safety we recommend these precaution for the first 24 hours after your procedure:  Do not drive or use heavy equipment.  Do not make important decisions or sign legal papers.  Do not drink alcohol.  Have someone stay with you, if needed. He or she can watch for problems and help keep you safe.  Your concentration, balance, coordination, and judgement may be impaired for many hours after anesthesia.  Use caution when ambulating or standing up.     You may feel weak and "washed out" after anesthesia and surgery.      Subtle residual effects of general anesthesia or sedation with regional / local anesthesia can last more than 24 hours.  Rest for the remainder of the day or longer if your Doctor/Surgeon has advised you to do so.  Although you may feel normal within the first 24 hours, your reflexes and mental ability may be impaired without you realizing it.  You may feel dizzy, lightheaded or sleepy for 24 hours or longer.      Be sure to go to all follow-up visits with your doctor. And rest after your surgery for as long as your doctor tells you to.  Coping with pain  If you have pain after surgery, pain medicine will help you feel better. Take it as told, before pain becomes severe. Also, ask your doctor or pharmacist about other ways to control pain. This might be with heat, ice, or relaxation. And follow any other instructions your surgeon or nurse gives you.  Tips " for taking pain medicine  To get the best relief possible, remember these points:  Pain medicines can upset your stomach. Taking them with a little food may help.  Most pain relievers taken by mouth need at least 20 to 30 minutes to start to work.  Taking medicine on a schedule can help you remember to take it. Try to time your medicine so that you can take it before starting an activity. This might be before you get dressed, go for a walk, or sit down for dinner.  Constipation is a common side effect of pain medicines. Call your doctor before taking any medicines such as laxatives or stool softeners to help ease constipation. Also ask if you should skip any foods. Drinking lots of fluids and eating foods such as fruits and vegetables that are high in fiber can also help. Remember, do not take laxatives unless your surgeon has prescribed them.  Drinking alcohol and taking pain medicine can cause dizziness and slow your breathing. It can even be deadly. Do not drink alcohol while taking pain medicine.  Pain medicine can make you react more slowly to things. Do not drive or run machinery while taking pain medicine.  Your health care provider may tell you to take acetaminophen to help ease your pain. Ask him or her how much you are supposed to take each day. Acetaminophen or other pain relievers may interact with your prescription medicines or other over-the-counter (OTC) drugs. Some prescription medicines have acetaminophen and other ingredients. Using both prescription and OTC acetaminophen for pain can cause you to overdose. Read the labels on your OTC medicines with care. This will help you to clearly know the list of ingredients, how much to take, and any warnings. It may also help you not take too much acetaminophen. If you have questions or do not understand the information, ask your pharmacist or health care provider to explain it to you before you take the OTC medicine.  Managing nausea  Some people have an  upset stomach after surgery. This is often because of anesthesia, pain, or pain medicine, or the stress of surgery. These tips will help you handle nausea and eat healthy foods as you get better. If you were on a special food plan before surgery, ask your doctor if you should follow it while you get better. These tips may help:  Do not push yourself to eat. Your body will tell you when to eat and how much.  Start off with clear liquids and soup. They are easier to digest.  Next try semi-solid foods, such as mashed potatoes, applesauce, and gelatin, as you feel ready.  Slowly move to solid foods. Dont eat fatty, rich, or spicy foods at first.  Do not force yourself to have 3 large meals a day. Instead eat smaller amounts more often.  Take pain medicines with a small amount of solid food, such as crackers or toast, to avoid nausea.     Call your surgeon if  You still have pain an hour after taking medicine. The medicine may not be strong enough.  You feel too sleepy, dizzy, or groggy. The medicine may be too strong.  You have side effects like nausea, vomiting, or skin changes, such as rash, itching, or hives.       If you have obstructive sleep apnea  You were given anesthesia medicine during surgery to keep you comfortable and free of pain. After surgery, you may have more apnea spells because of this medicine and other medicines you were given. The spells may last longer than usual.   At home:  Keep using the continuous positive airway pressure (CPAP) device when you sleep. Unless your health care provider tells you not to, use it when you sleep, day or night. CPAP is a common device used to treat obstructive sleep apnea.  Talk with your provider before taking any pain medicine, muscle relaxants, or sedatives. Your provider will tell you about the possible dangers of taking these medicines.  © 9352-3721 [employer]. [address] [place] [address], [place] [address]. All rights reserved. This information is  not intended as a substitute for professional medical care. Always follow your healthcare professional's instructions.              Using an Incentive Spirometer    An incentive spirometer is a device that helps you do deep breathing exercises. These exercises expand your lungs, aid in circulation, and help prevent pneumonia. Deep breathing exercises also help you breathe better and improve the function of your lungs by:  Keeping your lungs clear  Strengthening your breathing muscles  Helping prevent respiratory complications or problems  The incentive spirometer gives you a way to take an active part in recover. A nurse or therapist will teach you breathing exercises. To do these exercises, you will breathe in through your mouth and not your nose. The incentive spirometer only works correctly if you breathe in through your mouth.  Steps to clear lungs  Step 1. Exhale normally. Then, inhale normally.  Relax and breathe out.  Step 2. Place your lips tightly around the mouthpiece.  Make sure the device is upright and not tilted.  Step 3. Inhale as much air as you can through the mouthpiece (don't breath through your nose).  Inhale slowly and deeply.  Hold your breath long enough to keep the balls or disk raised for at least 3 to 5 seconds, or as instructed by your healthcare provider.  Some spirometers have an indicator to let you know that you are breathing in too fast. If the indicator goes off, breathe in more slowly.  Step 4. Repeat the exercise regularly.  Do this exercise every hour while you're awake, or as instructed by your healthcare provider.  If you were taught deep breathing and coughing exercises, do them regularly as instructed by your healthcare provider.       We hope your stay was comfortable as you heal now, mend and rest.    For we have enjoyed taking care of you by giving your our best.    And as you get better, by regaining your health and strength;   We count it as a privilege to have served you  and hope your time at [facility] was well spent.      Thank  You!!!

## 2024-11-21 NOTE — PLAN OF CARE
Pt POC explained, v/u. Discharge instructions reviewed, v/u. All questions answered.  prescriptions given. Pt discharged via wheelchair with RN to private vehicle with family member in stable condition.

## 2024-11-21 NOTE — OP NOTE
[facility] Urology Operative/Brief Discharge Note     Date: 11/21/24     Staff Surgeon: Av Brantley MD     Pre-Op Diagnosis:   BPH with obstruction/LUTS       Post-Op Diagnosis: same     Procedure(s) Performed:   Urolift: cystoscopy with prostatic urethral lift/transprostatic tissue retraction (02057) with delivery of 9 total implants (52442 x 8)      Specimen(s):  none     Anesthesia: General LMA anesthesia     Findings:    Significant lateral lobe obstruction with no intravesical extension or median lobe, and hypervascular gland  - after standard 4 implants, good proximal retraction but continued distal inferior nodular ingrowth for which ATC was used to deliver implant to each of these, though both noted to be poorly retracting with floating urethral end plates, and after additional ATC to left gland tissue, the original bilateral ATC implants were removed (full implant with capsular tab removed from right distal inferior gland, urethral end plate only from left distal inferior implant)  - now with 5 implants in place having delivered 7, when additional standard implant per side utilized to asymmetric midgland obstructing tissue  -  moderate anterior channel bleeding requiring Bugbee spot fulguration, as well as punctate at sites of removed implants     Estimated Blood Loss: minimal     Drains: 22 fr petit       Complications: none     Indications for procedure:  58 yo M with significant BPH/LUTS history, as well as rising PSA velocity but found to have only 1 core of low volume low-grade Freddy 6 prostate cancer with low risk genetic analysis, who elected active surveillance, but given the significance of his BPH/LUTS and urinary symptoms, elected to proceed with minimally invasive BPH intervention with UroLift, to not impact potential future prostate cancer treatment and yield resolution of his severe obstructive lower urinary tract symptoms refractory to medical management. After extensive risk benefit  discussion, he elected to proceed with UroLift.  All risks and benefits discussed and appropriate informed consent obtained     Procedure in detail:  After appropriate informed consent was obtained, the patient was taken to the cystoscopy suite in the operating room.  Preoperative antibiotics were given and a WHO proved timeout was performed.      20fr UroLift scope was passed per urethra and confirmed previous cystoscopic findings noted above.  Bilateral ureteral orifices were in their orthotopic position on the trigone bilaterally and the bladder otherwise appeared normal with moderate trabeculations.     Cystoscopy bridge was then replaced with a urolift delivery device.  The first treatment site was 2cm distal to the bladder neck anterior channel on the left. The distal tip of the delivery device was then angled laterally, approximately 10° at this position, to compress the lateral lobe. The trigger was pulled to deploy a needle containing the implant through the capsule of the prostate, and additional 10°. The needle was then retracted allowing the implants to be delivered to the capsular surface of the prostate which was then tensioned to a short capsular tensioning and removal of the slack monofilament.  The device was then angled back towards midline and slowly advanced proximally about 3-4 mm until cystoscopic verification that the monofilament was centered in the delivery bay of the device.  Excess filament was then severed with suture cut maneuver of device. The delivery device was then readvanced into the bladder, and leaving the cystoscopic sheath in place, and implant cartridge removed and replaced with a second Urolift implant cartrdige.  This was repeated contralaterally.     After the above, this process was repeated in the anterior channel, at the level of the verumontanum.      After initial 4 implants were delivered as above, there was  residual outlet obstuction of distal tissue left > right and  good proximal retraction, and as tissue was inferior nodular ingrowth, ATC was used to deliver implant to each of these, though both noted to be poorly retracting with floating urethral end plates, and after additional ATC to left gland tissue, the original bilateral ATC implants were removed via cystoscope with graspers grabbing implant and pushing towards bladder and removing (noting full implant with capsular tab removed from right distal inferior gland, urethral end plate only from left distal inferior implant), and prior to replacing with UroLift scope Bugbee monopolar cautery was used briefly at the sites of implant removal as well as some of the proximal anterior bleeding from initial implant placement.    Now, having delivered 7 implants for which 5 were still in place, 3 left-sided and 2 right-sided, view from visual obturator still had some asymmetric ingrowth of mid to distal gland tissue bilaterally for which a standard implant was used 1 per side in the areas of asymmetry to resolve residual obstruction after which view from verumontanum had continuous open channel with open bladder neck at rest.  Total now 7 implants placed, after delivery of 9, 3 on the right and 4 on the left.     There was moderate anterior urethral and bladder neck bleeding for which the rigid cystoscope was passed back in and Bugbee monopolar electrocautery was used to spot fulgurate these areas.  and once hemostatic and unobstructed and hemostatic with view from the verumontanum to the bladder neck with flow of irrigation off, and the scope off-loaded.      A 22 Italian  Sanchez passed with 30cc in 10 cc balloon  and irrigated with 60 cc catheter tip syringe noting urine to be clear, and then placed to traction on thigh with mastasol and silk tape.  He tolerated procedure well without complication and was awakened and taken to PACU without incident.       Disposition:   He will be observed in PACU to ensure urine remains reasonably  clear and does not require irrigation as he comes off traction and  hydrates and meets pacu criteria, then will be discharged home. Prophylactic postoperative antibiotics Rxed. Will remove petit at home SAT AM, pod2,  and return in 1 month for reeval with repeat symptom assessment and measurement of postvoid residual, and plan future AS for his CaP     Discharge:  Dispo: home  Diet: regular  FU: 1 month NP  Instructions:  Avoid strenuous activity until 3-5 days AFTER petit removed  No riding mowers, bicycles, motorcycles, tractors, or sexual activity/ejaculation for 2-3 weeks  Remove petit at home with 10 cc syringe provided as instructed on SATURDAY AM between 8-9am   *if unable to void within 4-6 hours after removal present to [facility] ([facility] for eval, and if petit needs replaced note need for coude (-DAY) petit  Hold aspirin fish oil vitamin E etc 3-5 days   OTC meds for discomfort such as ibuprofen/advil, tylenol OK  Take steroid dose pack as directed to decrease postop inflammation starting today  - in addition on day of petit removal may want to schedule 600 motrin/ibuprofen [id number] hours  Continue alfuzosin/uroxatral for ONE WEEK after petit is removed then STOP IT  - new 2 week supply sent to pharmacy as chart indicated Rx expires today  For burning with urination that persists AFTER petit removed, use pyridium as needed  Pink/clear red (koolaid) urine ok as long as clear/translucent without dark blood or clots, and urinating well/draining well without difficulty - drink lots of water.  WATER WATER WATER!!  May see blood drip around petit, is ok and normal within first few days  Avoid constipation, pushing/straining with BM. Use stool softener if needed  Will have increased urgency and frequency after removing catheter so avoid/minimize bladder irritants (coffee, soda, tea, alcohol) - this can mean worsening urge incontinence (urinating before you can make it there) which gets worse  before it gets better  Complete antibiotics - 5d prophylactic Rx sent  While catheter is in, use leg bag when ambulatory and always switch to large bag at night.   Lubricate tip of penis around catheter as needed with plain KY or plain vaseline to avoid sticking  Catheter will move with you so may appear to be going in/out but it is not and the clip on the leg helps keep it from pulling tension.  Ok to shower with catheter in place. All catheter and bags can get wet. Towel dry. Use soap and water gently at end of penis and around catheter to keep clean. No soaks/baths until petit removed.   Urolift implants are mri safe - will get safety card - likewise will not set off any metal detectors, including airport  Meds:     Medication List        START taking these medications      methylPREDNISolone 4 mg tablet  Commonly known as: MEDROL DOSEPACK  use as directed     phenazopyridine 200 MG tablet  Commonly known as: PYRIDIUM  Take 1 tablet (200 mg total) by mouth 3 (three) times daily as needed (dysuria after petit removal).     sulfamethoxazole-trimethoprim 800-160mg 800-160 mg Tab  Commonly known as: BACTRIM DS  Take 1 tablet by mouth 2 (two) times daily. for 5 days            CONTINUE taking these medications      acetaminophen 325 MG tablet  Commonly known as: TYLENOL     alfuzosin 10 mg Tb24  Commonly known as: UROXATRAL  Take 1 tablet (10 mg total) by mouth every evening. for 14 days     pantoprazole 40 MG tablet  Commonly known as: PROTONIX     sildenafiL 100 MG tablet  Commonly known as: VIAGRA  Take 1 tablet (100 mg total) by mouth as needed for Erectile Dysfunction (ed).               Where to Get Your Medications        These medications were sent to [facility]/pharmacy #[id number] - [place], [place] - [address] AT [facility]  [address], [place] [address]      Phone: [phone]   alfuzosin 10 mg Tb24  methylPREDNISolone 4 mg tablet  phenazopyridine 200 MG tablet  sulfamethoxazole-trimethoprim  800-160mg 800-160 mg Tab

## 2024-11-21 NOTE — H&P
[facility] Urology Progress Note     Felipe Rogers is a 59 y.o. male who presents for fu BPH with rising psa velocity, s/p cystoscopy and prostate biopsy     Initial eval in 2023 for rising psa velocity and borderline elevation  PSA Hiastory: 21: 2.25; 22: 3.42; 23: 3.94  Lab review also notes borderline low VitD (29.15 on 23, from 23.34 on 22)  He was previously taking testosterone replacement 200mg IM Q2 weeks, as well as moujaro and vit D supplementation  He is on flomax for LUTS 0.4mg nightly since 2023. Stopped it about 1 month ago as was having orthostasis and had cards adjust PM settings but now back to baseline settings. Pacemaker for bradycardia . Dr Branch.  Tried daily cialis prior bc also mild ed but no real improvement in luts  Testosterone off and on for years. Stopped when psa went up 6 mos ago. Has been off   Wasn't taking it regularly anyways before - injected about every 6 weeks not every 2 weeks.   No known history of uti prostatitis.   Father  early 50s - so no known CaP in family.   AUA symptom score 17/4 (4: Weak stream; 3: Emptying, intermittency, straining; 2: Frequency; 1: Urgency, sleeping)  Flomax helps 7/10 and felt that he was voiding better on it, but discontinued it as above.  Cscope at 51, and had another with 12mm polyp and is on 3 yr plan.  No hematuria. Is on tractor often has seen brown urine but also with poor fluid intake     2023   Repeat psa 23 was 3.0 (23% free)  MRI of the prostate was done (though without contrast) at [facility] on 23 noting the prostate gland is 65 g at 5.7 x 4.7 x 4.6 cm and negative/PI-rad 2 only.  Tried uroxatral and didn't see much difference on flomax vs uroxatral so stayed on uroxatral   AUA symptom score 15/4, mostly satisfied (4: Weak stream; 3: Urgency; 2: Emptying, frequency, straining; 1: Intermittency, sleeping).  Medication helps 4/10.    Postvoid residual by bladder scan is 21 cc.   Urinalysis dipstick is negative.    urgency has been progressive.  No significant bothersome frequency during the day.  When the urge comes on, he can always make it to the bathroom no urge incontinence.    But this has been progressive.     In interim after last visit continued Uroxatral and plan lower tract evaluation with cysto/truss in January, and discuss repeating MRI with contrast however due to pacemaker issues did not do so, after risk benefit discussion, and then ultimately postpone procedures secondary to ankle fracture DVT/PE/blood thinners.    8/16/24:  No longer on bloodthinners. Off since may. Had interim DVT/PE from ankle fracture.  Only ever did TRT a few months years ago. Didn't seer much benefit  LUTS seemed to be progressing even despite starting medication at last visit.  Previously was taken off Flomax by Cardiology due to blood pressure concerns.  Daily Cialis was not improving LUTS nor ED, and started Uroxatral last visit.    AUA symptom score today is 14/5, unhappy (3: Frequency, urgency; 2: Emptying, weak stream, straining; 1: Intermittency, sleeping) medication helps 4/10. Notes prolonged voiding times.  A bit more urinary frequency.  Daytime frequency at least 8-10 times.  Does drink tea throughout the day but has limited fluid intake overall due to his urinary frequency. Has had some close urgency lately which is new.  Motorcylce/riding mowers  Baseline ED - viagra in past worked better than cialis with headache. Daily cialis no benefit.  Recent wt gain after ankle fx     Cysto/TRUS-biopsy 9/27/24  PSA: 3.6 (24.17% free);TRUS VOLUME: 68.02 (W 54.77mm, H 35.53mm, L 66.82mm)  SPECIMEN:  14 core prostate biopsy - right and left base, middle, apex - medial and lateral of each, and bilateral TZ cores   ULTRASOUND FINDINGS: scattered hypodensities and calcifications, with more discreet hypoechoic area at left apex  CYSTO FINDINGS: Significant kissing lateral lobe obstruction proximally and  "distally, without significant elevation of bladder neck, no median lobe, no significant intravesical extension just mild circumferential impression of prostate at bladder base of which lateral lobe obstruction could be seen on retroflexion at the outlet.  On withdrawal of scope, lateral lobe ingrowth and obstruction seen almost immediately upon withdrawing from bladder neck, and throughout the prostatic urethra.  Hypervascular. Moderately trabeculated bladder.  PATH: 1/14 cores+ G6  3. RIGHT MID LATERAL: - PROSTATIC ADENOCARCINOMA, RAQUEL SCORE 3+3=6 (GRADE GROUP 1) - LESS THAN 1 MM (5% OF SUBMITTED TISSUE), 1 OF 1 CORE      He returns today with wife to review pathology and next steps  Did well after biopsy  No fevers chills or concerns  Symptom profile has progressed since above with weaker stream and more urgency, bordering on urge incontinence.  Only used testosterone replacement very briefly many years ago.  Has been years since use           Focused Physical Exam:         Vitals:     10/04/24 1106   BP: (!) 151/88   Pulse: (!) 56      Body mass index is 36.15 kg/m². Weight: 124.3 kg (274 lb 0.5 oz) Height: 6' 1" (185.4 cm)      Abdomen: Soft, non-tender, nondistended, no CVA tenderness  Rrr  Ctab    10 pt ros neg except as noted       Recent Results         Recent Results (from the past 2 weeks)   POCT Urinalysis (Instrument)     Collection Time: 09/27/24  6:53 AM   Result Value Ref Range     Color, POC UA Yellow Yellow, Straw, Colorless     Clarity, POC UA Clear Clear     Glucose, POC UA Negative Negative     Bilirubin, POC UA Negative Negative     Ketones, POC UA Negative Negative     Spec Grav POC UA >=1.030 1.005 - 1.030     Blood, POC UA Negative Negative     pH, POC UA 5.5 5.0 - 8.0     Protein, POC UA Negative Negative     Urobilinogen, POC UA 0.2 <=1.0     Nitrite, POC UA Negative Negative     WBC, POC UA Trace (A) Negative            ASSESSMENT   1. Prostate cancer          2. BPH with " "obstruction/lower urinary tract symptoms  Procedure Order to Urology                Plan  I sat with the patient and his wife and explained in detail his diagnosis of prostate cancer, including explanation of enzo grading system, and went over all the treatment options for management of his prostate cancer. The treatment options include detailed discussions of surveillance, radiation treatment including external beam as well as brachytherapy, and surgical extirpative therapy namely robotic prostatectomy. We did discuss that given his single core low volume Enzo 6 CaP at low risk,  that he is a candidate for active surveillance, and in fact meets criteria for "very low risk disease" with <1/3 cores+, <50% involvement of core, and PSAD <0.15, and thus active surveillance is recommended. We did also discuss risks and benefits of radiation and surgery  in detail and I answered many questions about various aspects of both options, of which I answered radiation related questions to the best of my ability.     We discussed radical prostatectomy. The procedure in general including hospital stay and postoperative process were discussed in detail. Risks of surgery were discussed including but not limited to up-front urinary incontinence and erectile dysfunction which we will work to overcome with kegel excercises and any number of ED therapies, versus side effects of radiation which are often minimal and irritative upfront with more troubling complications such as stricture and radiation cystitis occurring late. We also briefly discussed psa monitoring post-treatment and had brief discussion about psa recurrence, noting radiation could be used as salvage therapy after surgery but not often vice versa. We discussed concurrent pelvic lymphadenectomy with surgery, and that sometimes lymph nodes are included in radiation fields dependent on risk. Also discussed concurrent use of ADT with radiation and its side effects such " as fatigue, hot flashes, ED.      Active surveillance involves following PSA every 6 months, often updating biopsy at 1 year targeting areas of previous positivity, and then alternating evaluations with MRI and biopsy, spacing these evaluations 2-3 years in the case of stable PSA and evaluating sooner in the case of PSA rise.  Certainly in his case he did have an MRI which has normal PSA density and no distinct concerns, but was only a non contrasted MRI, and therefore a PSA remained stable at 1 year could update MRI with true with and without contrast protocol and as long as no concerns and PSA stable, defer biopsy to later and surveillance with PSA rises.     Given his very low risk prostate cancer, yet his significant prostate obstruction and significant symptomatic BPH/LUTS refractory to medical management (couldn't tolerate flomax due to side effects and has been on alfuzosin > 1 yr with progressive symptoms, with signs of chronic obstruction of the bladder and bothersome LUTS despite increasing dose of alpha blockers, we did have risk benefit discussion about minimally invasive BPH intervention while pursuing active surveillance for prostate cancer verses prostatectomy which would yield resolution of both prostate cancer and prostate obstruction, albeit at the expense of risks of surgery and his postprostatectomy continence recovery. As well had dvt/pe in last year - now off bloodthinners, but would increase risk (though ppx measures would be taken). We did briefly discuss minimally invasive/robotic prostatectomy, and a pamphlet on robotic prostatectomy was provided. Specifics to surgical procedure, hospital stay, and recovery were discussed.  However also reviewed in detail that more at risk to him right now is his outlet obstruction, and minimally invasive intervention such as with UroLift, would not impact future surveillance or treatment of prostate cancer. I did provide [facility] prostate cancer patient  "guideline booklet for review, marking the very low risk page for him      He has moderate prostatomegaly and significant lateral lobe obstruction in the absence of median lobe or intravesical extension and moderate to severe despite alpha blockers, for which symptoms have been progressive on and he would like to discontinue. Discussed options for intervention such as urolift, rezum, turp, and aquablation. Risks benefits postop protocols and side effect profiles of these urologic procedures, as well as potential future impacts on possible treatment for prostate cancer if needed.    He is most interested in proceeding with Urolift after discussion of all risks, benefits, and alternatives. Especially given minimal risk profile and lack of sexual side effects, as well as not destroying any prostate tissue and having minimal impact on future evaluation for, and potential for treatment if need, of prostate cancer in the future. We reviewed the implants are MRI safe and only create a 5 mm artifact at the implant site, thus can continue to MRI. As well, should disease progress and he elect to undergo treatment, would not impact the treatment either by radiation or prostatectomy, as can serve as fiducial markers for radiotherapy, and are seated within the prostate capsule and do not affect the ability to perform prostatectomy, even nerve-sparing prostatectomy.  They are just removed with the prostate. In interim however, would serve to relieve his obstructive lower urinary tract symptoms, eliminate the need for medical therapy, and protect bladder from progressive obstructive damage.    - No allergy to nickel, no UTI or prostatitis history. No median lobe. Gland size 65g by MRI, 68g by TRUS. Clinically significant prostate cancer ruled out via workup above (cT1c, neg mri, 1 core positive in "very low risk" category)     Procedure in detail discussed. Discussed risks including but not limited to hematuria, postop retention, " pain, infection, injury to bladder or urethra, and worsening urgency, latent pelvic pain, no guarantee of symptomatic relief, prostate regrowth, need for future procedures. We did discuss the non-incidence of ejaculatory dysfunction or erectile dysfunction, as well as the 13% recurrence risk in 5 years. Also discussed about 20% of people may need a catheter after (due to retention or hematuria) but not required if voiding postop, though tend to leave petit prophylactically at least overnight, which he is agreeable to.  Discussed that symptomatic relief may take longer to be fully appreciated, in the setting of longer standing obstruction, and period of symptomatic adjustment postop. Also discussed transient increases in urgency frequency which may yield transient UUI in postop period.     All questions patient and wife had answered and appropriate informed consent obtained.    UroLift scheduled in the OR on 11/21/24 per patient request.    Will continue active surveillance from there and coordinate his post UroLift LUTS follow-ups with appropriate timeline active surveillance (as next PSA will be due in 6 mos)  As well, when continuing active surveillance, discuss the value of genetic analysis in algorithm and treatment decisions.  There are few, some which differentiate the 10 year metastasis risk if treated or not treated, but in his case would benefit from utilization of Oncotype DX to assess risks of adverse pathology, as if tumor biology was demonstrative of concern for significant clinical progression, pursuing treatment may be prudent.  Otherwise, all data and imaging so far with his clinical T1c very low risk disease suggest that were prostate cancer standpoint he was safe, and more risk from his outlet obstruction thus plans to manage it (especially in a manner that will not impact future treatment, if needed)

## 2024-11-21 NOTE — PLAN OF CARE
Pt prepped for surgery, ABX at BS. Pt belongings, including clothes, glasses, and cell phone are in personal belongings bag at post-op locker. Wife, [employer], signed up for text message alerts. Breathing exercises completed by Respiratory. All questions answered, POC explained, v/u, call bell in reach.

## 2024-11-21 NOTE — ANESTHESIA POSTPROCEDURE EVALUATION
Anesthesia Post Evaluation    Patient: Felipe Rogers    Procedure(s) Performed: Procedure(s) (LRB):  CYSTOSCOPY, WITH INSERTION OF UROLIFT IMPLANT (N/A)    Final Anesthesia Type: general      Patient location during evaluation: PACU  Patient participation: Yes- Able to Participate  Level of consciousness: awake and alert  Post-procedure vital signs: reviewed and stable  Pain management: adequate  Airway patency: patent    PONV status at discharge: No PONV  Anesthetic complications: no      Cardiovascular status: hemodynamically stable  Respiratory status: unassisted and room air  Hydration status: euvolemic  Follow-up not needed.              Vitals Value Taken Time   /66 11/21/24 1200   Temp 36.5 °C (97.7 °F) 11/21/24 1135   Pulse 70 11/21/24 1200   Resp 18 11/21/24 1200   SpO2 96 % 11/21/24 1200         Event Time   Out of Recovery 11:43:00         Pain/Alejandro Score: Pain Rating Prior to Med Admin: 7 (11/21/2024  9:50 AM)  Pain Rating Post Med Admin: 3 (11/21/2024 10:25 AM)  Alejandro Score: 10 (11/21/2024 11:00 AM)  Modified Alejandro Score: 19 (11/21/2024 12:00 PM)

## 2024-11-21 NOTE — TRANSFER OF CARE
"Anesthesia Transfer of Care Note    Patient: Felipe Rogers    Procedure(s) Performed: Procedure(s) (LRB):  CYSTOSCOPY, WITH INSERTION OF UROLIFT IMPLANT (N/A)    Patient location: PACU    Anesthesia Type: general    Transport from OR: Transported from OR on 6-10 L/min O2 by face mask with adequate spontaneous ventilation    Post pain: adequate analgesia    Post assessment: no apparent anesthetic complications and tolerated procedure well    Post vital signs: stable    Level of consciousness: sedated    Nausea/Vomiting: no nausea/vomiting    Complications: none    Transfer of care protocol was followed    Last vitals: Visit Vitals  BP (!) 148/69 (BP Location: Left arm, Patient Position: Lying)   Pulse 62   Temp 36.9 °C (98.4 °F) (Temporal)   Resp 17   Ht 6' 1" (1.854 m)   Wt 121.6 kg (268 lb)   SpO2 96%   BMI 35.36 kg/m²     "

## 2024-11-21 NOTE — ANESTHESIA PREPROCEDURE EVALUATION
11/21/2024  Felipe Rogers is a 59 y.o., male.      Pre-op Assessment    I have reviewed the Patient Summary Reports.     I have reviewed the Nursing Notes. I have reviewed the NPO Status.   I have reviewed the Medications.     Review of Systems  Anesthesia Hx:  No problems with previous Anesthesia                Social:  Non-Smoker       Cardiovascular:    Pacemaker            hyperlipidemia    Bradycardia                            Pulmonary:        Sleep Apnea                Renal/:    BPH              Endocrine:        Obesity / BMI > 30      Physical Exam  General: Well nourished, Cooperative, Alert and Oriented    Airway:  Mallampati: II   Mouth Opening: Normal  TM Distance: Normal  Neck ROM: Normal ROM    Dental:  Intact    Chest/Lungs:  Normal Respiratory Rate    Heart:  Rate: Normal        Anesthesia Plan  Type of Anesthesia, risks & benefits discussed:    Anesthesia Type: Gen Supraglottic Airway  Intra-op Monitoring Plan: Standard ASA Monitors  Post Op Pain Control Plan: multimodal analgesia and IV/PO Opioids PRN  Induction:  IV  Informed Consent: Informed consent signed with the Patient and all parties understand the risks and agree with anesthesia plan.  All questions answered.   ASA Score: 2  Day of Surgery Review of History & Physical: H&P Update referred to the surgeon/provider.    Ready For Surgery From Anesthesia Perspective.     .

## 2024-11-21 NOTE — PLAN OF CARE
Petit cath remains in place and draining well with light red urine.  Leg bag placed and instructions given on leg bag and big bag at night.  10 cc syringe given for patient to remove petit cath on sat am between 8 and 9 am.  Instructed patient and spouse.  Both verbalized understanding.  Urolift card given to patient.

## 2024-11-22 VITALS
TEMPERATURE: 98 F | HEIGHT: 73 IN | WEIGHT: 268 LBS | OXYGEN SATURATION: 96 % | RESPIRATION RATE: 18 BRPM | HEART RATE: 70 BPM | DIASTOLIC BLOOD PRESSURE: 66 MMHG | BODY MASS INDEX: 35.52 KG/M2 | SYSTOLIC BLOOD PRESSURE: 140 MMHG

## 2024-12-13 ENCOUNTER — OFFICE VISIT (OUTPATIENT)
Dept: UROLOGY | Facility: CLINIC | Age: 59
End: 2024-12-13
Payer: COMMERCIAL

## 2024-12-13 VITALS — RESPIRATION RATE: 17 BRPM | BODY MASS INDEX: 35.53 KG/M2 | WEIGHT: 268.06 LBS | HEIGHT: 73 IN

## 2024-12-13 DIAGNOSIS — C61 PROSTATE CANCER: ICD-10-CM

## 2024-12-13 DIAGNOSIS — N13.8 BPH WITH OBSTRUCTION/LOWER URINARY TRACT SYMPTOMS: Primary | ICD-10-CM

## 2024-12-13 DIAGNOSIS — N40.1 BPH WITH OBSTRUCTION/LOWER URINARY TRACT SYMPTOMS: Primary | ICD-10-CM

## 2024-12-13 LAB
BILIRUBIN, UA POC OHS: NEGATIVE
BLOOD, UA POC OHS: ABNORMAL
CLARITY, UA POC OHS: CLEAR
COLOR, UA POC OHS: YELLOW
GLUCOSE, UA POC OHS: NEGATIVE
KETONES, UA POC OHS: NEGATIVE
LEUKOCYTES, UA POC OHS: ABNORMAL
NITRITE, UA POC OHS: NEGATIVE
PH, UA POC OHS: 5.5
PROTEIN, UA POC OHS: NEGATIVE
SPECIFIC GRAVITY, UA POC OHS: <=1.005
UROBILINOGEN, UA POC OHS: 0.2

## 2024-12-13 PROCEDURE — 99214 OFFICE O/P EST MOD 30 MIN: CPT | Mod: S$GLB,,, | Performed by: UROLOGY

## 2024-12-13 PROCEDURE — 1159F MED LIST DOCD IN RCRD: CPT | Mod: CPTII,S$GLB,, | Performed by: UROLOGY

## 2024-12-13 PROCEDURE — 1160F RVW MEDS BY RX/DR IN RCRD: CPT | Mod: CPTII,S$GLB,, | Performed by: UROLOGY

## 2024-12-13 PROCEDURE — 3008F BODY MASS INDEX DOCD: CPT | Mod: CPTII,S$GLB,, | Performed by: UROLOGY

## 2024-12-13 PROCEDURE — 99999 PR PBB SHADOW E&M-EST. PATIENT-LVL III: CPT | Mod: PBBFAC,,, | Performed by: UROLOGY

## 2024-12-13 PROCEDURE — 87086 URINE CULTURE/COLONY COUNT: CPT | Performed by: UROLOGY

## 2024-12-13 PROCEDURE — 81003 URINALYSIS AUTO W/O SCOPE: CPT | Mod: QW,S$GLB,, | Performed by: UROLOGY

## 2024-12-13 NOTE — PATIENT INSTRUCTIONS
Discussed conservative measures to control urgency and frequency including   1. Avoiding/minimizing bladder irritants (see below), especially in afternoon and evening hours    Discussed bladder irritants include coffe (even decaf), tea, alcohol, soda, spicy foods, acidic juices (orange, tomato), vinegar, and artificial sweeteners/sugary beverages.    2. timed voiding - empty on a schedule (approx ~2-3 hours) in spite of need to urinate, to get ahead of urge  And double void, wait to empty a bit    3. dont postponing voiding - dont hold it on purpose     4. bowel regimen as distended bowel has extrinsic compressive effect on bladder.   - any or all of the following in any combination, titrate to soft daily bowel movement without pushing or straining  - colace/stool softener capsule - once to twice daily  - miralax - 1 capful daily to start, can increase to 2x daily (or decrease to 1/2 cap daily)  - increase dietary fibery  - fiber supplements, such as metamucil  - prunes, prune juice    5. INCREASE water intake    6. Stop fluids 2 hours before bed, and urinate just before bed

## 2024-12-13 NOTE — PROGRESS NOTES
"[facility] Urology Progress Note    Felipe Rogers is a 59 y.o. male who presents for ***        Focused Physical Exam:    Vitals:    12/13/24 1122   Resp: 17     Body mass index is 35.37 kg/m². Weight: 121.6 kg (268 lb 1.3 oz) Height: 6' 1" (185.4 cm)     Abdomen: Soft, non-tender, nondistended, no CVA tenderness    Recent Results (from the past 2 weeks)   POCT Urinalysis(Instrument)    Collection Time: 12/13/24 11:24 AM   Result Value Ref Range    Color, POC UA Yellow Yellow, Straw, Colorless    Clarity, POC UA Clear Clear    Glucose, POC UA Negative Negative    Bilirubin, POC UA Negative Negative    Ketones, POC UA Negative Negative    Spec Grav POC UA <=1.005 1.005 - 1.030    Blood, POC UA Moderate (A) Negative    pH, POC UA 5.5 5.0 - 8.0    Protein, POC UA Negative Negative    Urobilinogen, POC UA 0.2 <=1.0    Nitrite, POC UA Negative Negative    WBC, POC UA Trace (A) Negative       ASSESSMENT   1. BPH with obstruction/lower urinary tract symptoms  POCT Urinalysis(Instrument)    POCT Bladder Scan    Urine culture      2. Prostate cancer  Prostate Specific Antigen, Diagnostic          Plan    ***     Total time spent in/on encounter today, including face to face time with patient, counseling, medical record review, interpretation of tests/results, , and treatment plan coordination: *** minutes    Level of Medical Decision Making  [ _ ]  Low: 2 minor/1 stable chronic/1 acute uncomplicated --- review record/result/order test x2  [ _ ]  Mod: 1 chronic exac/2stable chronic/1 acute comp --- review record/result/order test x3 OR independent interp of outside test OR discuss mgmt of outside ordered test --- start drug therapy/plan minor surgery   [ _ ]  High: chronic with exacerbation/progression or trtmnt side effect vs acute/chronic w/ life/body threat ---  (2/3) review record/result/order test x3 OR independent interp of ouutside test OR discuss mgmt of outside ordered test --- drug with monitoring for " toxicity, plan major surgery, hospitalize, deescalate/withdraw care bc of prognosis       significant elevation of bladder neck, no median lobe, no significant intravesical extension just mild circumferential impression of prostate at bladder base of which lateral lobe obstruction could be seen on retroflexion at the outlet.  On withdrawal of scope, lateral lobe ingrowth and obstruction seen almost immediately upon withdrawing from bladder neck, and throughout the prostatic urethra.  Hypervascular. Moderately trabeculated bladder.  PATH: 1/14 cores+ G6  3. RIGHT MID LATERAL: - PROSTATIC ADENOCARCINOMA, RAQUEL SCORE 3+3=6 (GRADE GROUP 1) - LESS THAN 1 MM (5% OF SUBMITTED TISSUE), 1 OF 1 CORE      10/4/24  He returns today with wife to review pathology and next steps  Did well after biopsy  No fevers chills or concerns  Symptom profile has progressed since above with weaker stream and more urgency, bordering on urge incontinence.  Only used testosterone replacement very briefly many years ago.  Has been years since use   - elected AS and urolift for bph component    F/U GPS: genomic prostate score/GPS (formally Oncotype DX) was very low risk. The GPS score was 7 out of 100.  The risk of adverse pathology is 6%.  Estimated risk of high-grade disease would be 4%, estimated risk of disease outside the prostate 2%, metastasis within 10 years after treatment would be less than 1%.  Aligns with AS    Urolift 11/21/24  Significant lateral lobe obstruction with no intravesical extension or median lobe, and hypervascular gland  - after standard 4 implants, good proximal retraction but continued distal inferior nodular ingrowth for which ATC was used to deliver implant to each of these, though both noted to be poorly retracting with floating urethral end plates, and after additional ATC to left gland tissue, the original bilateral ATC implants were removed (full implant with capsular tab removed from right distal inferior gland, urethral end plate only from left distal inferior implant)  - now with 5 implants in  "place having delivered 7, when additional standard implant per side utilized to asymmetric midgland obstructing tissue  -  moderate anterior channel bleeding requiring Bugbee spot fulguration, as well as punctate at sites of removed implants    Removed petit pod2  Continued uroxatral until 1 week postop  Returns today noting  AUA SS:  13/5 (5: Frequency; 3: Urgency; 2: Weak stream; 1: Emptying, intermittency, sleeping)  Has had some days where the urine just dribbles.  Stream has noticed a spray all over the place, especially when Baca.  Nocturia has decreased from 2 times to 1 time.  Has had some urgency close to urge incontinence.  Since Petit removal had 1 night of pure blood and clots, since then has had rare drip of blood in his underwear.  Significant urgency is peaking later in the day so he is making regular bathroom stops.        Focused Physical Exam:    Vitals:    12/13/24 1122   Resp: 17     Body mass index is 35.37 kg/m². Weight: 121.6 kg (268 lb 1.3 oz) Height: 6' 1" (185.4 cm)     Abdomen: Soft, non-tender, nondistended, no CVA tenderness  Orthotopic urethral meatus without visible stenosis, and passage of a urethral meatal dilator had no resistance and was able to pass until the [facility] point without resistance (after prepping phallus sterilely with Betadine swabs and lubricating meatal dilator)    Recent Results (from the past 2 weeks)   POCT Urinalysis(Instrument)    Collection Time: 12/13/24 11:24 AM   Result Value Ref Range    Color, POC UA Yellow Yellow, Straw, Colorless    Clarity, POC UA Clear Clear    Glucose, POC UA Negative Negative    Bilirubin, POC UA Negative Negative    Ketones, POC UA Negative Negative    Spec Grav POC UA <=1.005 1.005 - 1.030    Blood, POC UA Moderate (A) Negative    pH, POC UA 5.5 5.0 - 8.0    Protein, POC UA Negative Negative    Urobilinogen, POC UA 0.2 <=1.0    Nitrite, POC UA Negative Negative    WBC, POC UA Trace (A) Negative       ASSESSMENT   1. BPH with " obstruction/lower urinary tract symptoms  POCT Urinalysis(Instrument)    POCT Bladder Scan    Urine culture      2. Prostate cancer  Prostate Specific Antigen, Diagnostic          Plan    We did review the natural course post relief of obstruction with UroLift noting some increases in urgency and frequency which worsened before they improve due to longstanding obstruction, and he did have some baseline irritative symptoms, and while this is not unexpected, and overall his obstructive symptoms are improving, he does have spraying of stream intermittency dribbling etcetera which is still concerning.  The spraying of stream and the intermittent drip of blood was most concerning for meatal stenosis or fossa navicularis narrowing, though blue meatal dilator was passed without any resistance largely ruling out any fossa navicularis stricture or narrowing.  We did however review that his prostate was on the larger side and given the anatomic considerations, with removal of implants and replacement of them during the procedure, he did have great relief of obstruction at the end, but there may be any scarring stricture contracture etcetera from the manipulation which could be yielding some of his persistent symptoms.  We discussed the possibility of cystoscopic evaluation to determine any anatomic considerations, but also reviewed that only within 4 weeks from the procedure, may still be a little bit early with some of his irritative symptoms settling out with bladder readjustment.  After discussion, will do a trial of 2 weeks back on alpha-blocker, Uroxatral prescribed, and 2 weeks off to see if it makes any difference being back on an alpha-blocker, and at minimum see him back in 3 months for re-evaluation, with PSA diagnostic prior as will be due for screening of his low risk prostate cancer.  Potential for cystoscopy in the interim based on medication trial should these significant obstructive and irritative symptoms  persist.  He will let us know and can plan cystoscopy as needed otherwise can discuss further at his follow-up visit.    Total time spent in/on encounter today, including face to face time with patient, counseling, medical record review, interpretation of tests/results, , and treatment plan coordination: 35 minutes

## 2024-12-15 LAB — BACTERIA UR CULT: NO GROWTH

## 2024-12-26 ENCOUNTER — PATIENT MESSAGE (OUTPATIENT)
Dept: UROLOGY | Facility: CLINIC | Age: 59
End: 2024-12-26
Payer: COMMERCIAL

## 2024-12-26 DIAGNOSIS — N13.8 BPH WITH OBSTRUCTION/LOWER URINARY TRACT SYMPTOMS: Primary | ICD-10-CM

## 2024-12-26 DIAGNOSIS — N40.1 BPH WITH OBSTRUCTION/LOWER URINARY TRACT SYMPTOMS: Primary | ICD-10-CM

## 2025-01-03 DIAGNOSIS — N40.1 BPH WITH OBSTRUCTION/LOWER URINARY TRACT SYMPTOMS: Primary | ICD-10-CM

## 2025-01-03 DIAGNOSIS — N13.8 BPH WITH OBSTRUCTION/LOWER URINARY TRACT SYMPTOMS: Primary | ICD-10-CM

## 2025-01-03 NOTE — PROGRESS NOTES
Procedure Order to Urology [[id number]]    Electronically signed by: Av Brantley MD on 01/03/25 0713 Status: Active   Ordering user: Av Brantley MD 01/03/25 0713 Authorized by: Av Brantley MD   Ordering mode: Standard   Frequency:  01/03/25 -     Diagnoses  BPH with obstruction/lower urinary tract symptoms [N40.1, N13.8]   Questionnaire    Question Answer   Procedure Cystoscopy Comment - 1/24 fri [facility]   Facility Name: [facility], Please order Urine POCT without micro . Local sedation (no urine Dr Knight or Dr rangel)

## 2025-01-24 ENCOUNTER — HOSPITAL ENCOUNTER (OUTPATIENT)
Facility: HOSPITAL | Age: 60
Discharge: HOME | End: 2025-01-24
Attending: UROLOGY | Admitting: UROLOGY
Payer: COMMERCIAL

## 2025-01-24 DIAGNOSIS — N13.8 BPH WITH OBSTRUCTION/LOWER URINARY TRACT SYMPTOMS: ICD-10-CM

## 2025-01-24 DIAGNOSIS — N40.1 BPH WITH OBSTRUCTION/LOWER URINARY TRACT SYMPTOMS: ICD-10-CM

## 2025-01-24 LAB
BILIRUBIN, POC UA: NEGATIVE
BLOOD, POC UA: NEGATIVE
CLARITY, UA: CLEAR
COLOR, UA: ABNORMAL
GLUCOSE, POC UA: NEGATIVE
KETONES, POC UA: NEGATIVE
LEUKOCYTE EST, POC UA: NEGATIVE
NITRITE, POC UA: NEGATIVE
PH UR STRIP: 5.5 [PH] (ref 5–8)
PROTEIN, POC UA: NEGATIVE
SPECIFIC GRAVITY, POC UA: >=1.03 (ref 1–1.03)
UROBILINOGEN, POC UA: 0.2 E.U./DL

## 2025-01-24 PROCEDURE — 52281 CYSTOSCOPY AND TREATMENT: CPT | Performed by: UROLOGY

## 2025-01-24 PROCEDURE — 25000003 PHARM REV CODE 250: Performed by: UROLOGY

## 2025-01-24 PROCEDURE — 52281 CYSTOSCOPY AND TREATMENT: CPT | Mod: ,,, | Performed by: UROLOGY

## 2025-01-24 PROCEDURE — 52000 CYSTOURETHROSCOPY: CPT | Performed by: UROLOGY

## 2025-01-24 RX ORDER — LIDOCAINE HYDROCHLORIDE 20 MG/ML
JELLY TOPICAL
Status: DISCONTINUED | OUTPATIENT
Start: 2025-01-24 | End: 2025-01-24 | Stop reason: HOSPADM

## 2025-01-24 RX ORDER — CIPROFLOXACIN 500 MG/1
500 TABLET ORAL 2 TIMES DAILY
Qty: 6 TABLET | Refills: 0 | Status: SHIPPED | OUTPATIENT
Start: 2025-01-24 | End: 2025-01-27

## 2025-01-24 NOTE — H&P
[facility] Urology Progress Note     Felipe Rogers is a 59 y.o. male who presents for f/u BPH component s/p urolift also on AS for CaP     Initial eval in 2023 for rising psa velocity and borderline elevation  PSA Hiastory: 21: 2.25; 22: 3.42; 23: 3.94  Lab review also notes borderline low VitD (29.15 on 23, from 23.34 on 22)  He was previously taking testosterone replacement 200mg IM Q2 weeks, as well as moujaro and vit D supplementation  He is on flomax for LUTS 0.4mg nightly since 2023. Stopped it about 1 month ago as was having orthostasis and had cards adjust PM settings but now back to baseline settings. Pacemaker for bradycardia . Dr Branch.  Tried daily cialis prior bc also mild ed but no real improvement in luts  Testosterone off and on for years. Stopped when psa went up 6 mos ago. Has been off   Wasn't taking it regularly anyways before - injected about every 6 weeks not every 2 weeks.   No known history of uti prostatitis.   Father  early 50s - so no known CaP in family.   AUA symptom score 17/4 (4: Weak stream; 3: Emptying, intermittency, straining; 2: Frequency; 1: Urgency, sleeping)  Flomax helps 7/10 and felt that he was voiding better on it, but discontinued it as above.  Cscope at 51, and had another with 12mm polyp and is on 3 yr plan.  No hematuria. Is on tractor often has seen brown urine but also with poor fluid intake     2023   Repeat psa 23 was 3.0 (23% free)  MRI of the prostate was done (though without contrast) at [facility] on 23 noting the prostate gland is 65 g at 5.7 x 4.7 x 4.6 cm and negative/PI-rad 2 only.  Tried uroxatral and didn't see much difference on flomax vs uroxatral so stayed on uroxatral   AUA symptom score 15/4, mostly satisfied (4: Weak stream; 3: Urgency; 2: Emptying, frequency, straining; 1: Intermittency, sleeping).  Medication helps 4/10.    Postvoid residual by bladder scan is 21 cc.  Urinalysis dipstick is  negative.    urgency has been progressive.  No significant bothersome frequency during the day.  When the urge comes on, he can always make it to the bathroom no urge incontinence.    But this has been progressive.     In interim after last visit continued Uroxatral and plan lower tract evaluation with cysto/truss in January, and discuss repeating MRI with contrast however due to pacemaker issues did not do so, after risk benefit discussion, and then ultimately postpone procedures secondary to ankle fracture DVT/PE/blood thinners.    8/16/24:  No longer on bloodthinners. Off since may. Had interim DVT/PE from ankle fracture.  Only ever did TRT a few months years ago. Didn't seer much benefit  LUTS seemed to be progressing even despite starting medication at last visit.  Previously was taken off Flomax by Cardiology due to blood pressure concerns.  Daily Cialis was not improving LUTS nor ED, and started Uroxatral last visit.    AUA symptom score today is 14/5, unhappy (3: Frequency, urgency; 2: Emptying, weak stream, straining; 1: Intermittency, sleeping) medication helps 4/10. Notes prolonged voiding times.  A bit more urinary frequency.  Daytime frequency at least 8-10 times.  Does drink tea throughout the day but has limited fluid intake overall due to his urinary frequency. Has had some close urgency lately which is new.  Motorcylce/riding mowers  Baseline ED - viagra in past worked better than cialis with headache. Daily cialis no benefit.  Recent wt gain after ankle fx     Cysto/TRUS-biopsy 9/27/24  PSA: 3.6 (24.17% free);TRUS VOLUME: 68.02 (W 54.77mm, H 35.53mm, L 66.82mm)  SPECIMEN:  14 core prostate biopsy - right and left base, middle, apex - medial and lateral of each, and bilateral TZ cores   ULTRASOUND FINDINGS: scattered hypodensities and calcifications, with more discreet hypoechoic area at left apex  CYSTO FINDINGS: Significant kissing lateral lobe obstruction proximally and distally, without  significant elevation of bladder neck, no median lobe, no significant intravesical extension just mild circumferential impression of prostate at bladder base of which lateral lobe obstruction could be seen on retroflexion at the outlet.  On withdrawal of scope, lateral lobe ingrowth and obstruction seen almost immediately upon withdrawing from bladder neck, and throughout the prostatic urethra.  Hypervascular. Moderately trabeculated bladder.  PATH: 1/14 cores+ G6  3. RIGHT MID LATERAL: - PROSTATIC ADENOCARCINOMA, RAQUEL SCORE 3+3=6 (GRADE GROUP 1) - LESS THAN 1 MM (5% OF SUBMITTED TISSUE), 1 OF 1 CORE      10/4/24  He returns today with wife to review pathology and next steps  Did well after biopsy  No fevers chills or concerns  Symptom profile has progressed since above with weaker stream and more urgency, bordering on urge incontinence.  Only used testosterone replacement very briefly many years ago.  Has been years since use   - elected AS and urolift for bph component     F/U GPS: genomic prostate score/GPS (formally Oncotype DX) was very low risk. The GPS score was 7 out of 100.  The risk of adverse pathology is 6%.  Estimated risk of high-grade disease would be 4%, estimated risk of disease outside the prostate 2%, metastasis within 10 years after treatment would be less than 1%.  Aligns with AS     Urolift 11/21/24  Significant lateral lobe obstruction with no intravesical extension or median lobe, and hypervascular gland  - after standard 4 implants, good proximal retraction but continued distal inferior nodular ingrowth for which ATC was used to deliver implant to each of these, though both noted to be poorly retracting with floating urethral end plates, and after additional ATC to left gland tissue, the original bilateral ATC implants were removed (full implant with capsular tab removed from right distal inferior gland, urethral end plate only from left distal inferior implant)  - now with 5 implants in  "place having delivered 7, when additional standard implant per side utilized to asymmetric midgland obstructing tissue  -  moderate anterior channel bleeding requiring Bugbee spot fulguration, as well as punctate at sites of removed implants     Removed petit pod2  Continued uroxatral until 1 week postop  Returns today noting  AUA SS:  13/5 (5: Frequency; 3: Urgency; 2: Weak stream; 1: Emptying, intermittency, sleeping)  Has had some days where the urine just dribbles.  Stream has noticed a spray all over the place, especially when Baca.  Nocturia has decreased from 2 times to 1 time.  Has had some urgency close to urge incontinence.  Since Petit removal had 1 night of pure blood and clots, since then has had rare drip of blood in his underwear.  Significant urgency is peaking later in the day so he is making regular bathroom stops.           Focused Physical Exam:         Vitals:     12/13/24 1122   Resp: 17      Body mass index is 35.37 kg/m². Weight: 121.6 kg (268 lb 1.3 oz) Height: 6' 1" (185.4 cm)      Abdomen: Soft, non-tender, nondistended, no CVA tenderness  Orthotopic urethral meatus without visible stenosis, and passage of a urethral meatal dilator had no resistance and was able to pass until the [facility] point without resistance (after prepping phallus sterilely with Betadine swabs and lubricating meatal dilator)     Recent Results         Recent Results (from the past 2 weeks)   POCT Urinalysis(Instrument)     Collection Time: 12/13/24 11:24 AM   Result Value Ref Range     Color, POC UA Yellow Yellow, Straw, Colorless     Clarity, POC UA Clear Clear     Glucose, POC UA Negative Negative     Bilirubin, POC UA Negative Negative     Ketones, POC UA Negative Negative     Spec Grav POC UA <=1.005 1.005 - 1.030     Blood, POC UA Moderate (A) Negative     pH, POC UA 5.5 5.0 - 8.0     Protein, POC UA Negative Negative     Urobilinogen, POC UA 0.2 <=1.0     Nitrite, POC UA Negative Negative     WBC, POC UA Trace " (A) Negative            ASSESSMENT   1. BPH with obstruction/lower urinary tract symptoms  POCT Urinalysis(Instrument)     POCT Bladder Scan     Urine culture       2. Prostate cancer  Prostate Specific Antigen, Diagnostic                Plan  We did review the natural course post relief of obstruction with UroLift noting some increases in urgency and frequency which worsened before they improve due to longstanding obstruction, and he did have some baseline irritative symptoms, and while this is not unexpected, and overall his obstructive symptoms are improving, he does have spraying of stream intermittency dribbling etcetera which is still concerning.  The spraying of stream and the intermittent drip of blood was most concerning for meatal stenosis or fossa navicularis narrowing, though blue meatal dilator was passed without any resistance largely ruling out any fossa navicularis stricture or narrowing.  We did however review that his prostate was on the larger side and given the anatomic considerations, with removal of implants and replacement of them during the procedure, he did have great relief of obstruction at the end, but there may be any scarring stricture contracture etcetera from the manipulation which could be yielding some of his persistent symptoms.  We discussed the possibility of cystoscopic evaluation to determine any anatomic considerations, but also reviewed that only within 4 weeks from the procedure, may still be a little bit early with some of his irritative symptoms settling out with bladder readjustment.  After discussion, will do a trial of 2 weeks back on alpha-blocker, Uroxatral prescribed, and 2 weeks off to see if it makes any difference being back on an alpha-blocker, and at minimum see him back in 3 months for re-evaluation, with PSA diagnostic prior as will be due for screening of his low risk prostate cancer.  Potential for cystoscopy in the interim based on medication trial should  these significant obstructive and irritative symptoms persist.  He will let us know and can plan cystoscopy as needed otherwise can discuss further at his follow-up visit.

## 2025-01-27 VITALS
SYSTOLIC BLOOD PRESSURE: 146 MMHG | DIASTOLIC BLOOD PRESSURE: 72 MMHG | HEART RATE: 53 BPM | BODY MASS INDEX: 35.37 KG/M2 | RESPIRATION RATE: 16 BRPM | OXYGEN SATURATION: 98 % | WEIGHT: 268.06 LBS | TEMPERATURE: 98 F

## 2025-01-27 NOTE — OP NOTE
[facility] Urology Operative/Brief Discharge Note     Date: 01/24/25     Staff Surgeon: Av Brantley MD     Pre-Op Diagnosis:   BPH/LUTS    Post-Op Diagnosis:   meatal/fossa navicularis stricture  BPH/LUTS      Procedure(s) Performed:   Cystosopy, flexible, with meatal dilation     Specimen(s): none     Anesthesia: Local anesthesia topical 2% lidocaine gel     Findings:   Meatal stenosis with mild fossa navicularis narrowing <16fr as would not accommodate cystoscope  Prostatic urethra post urolift open/great retraction proximal at bladder neck  Good retraction distally especially anterior channel  Mid gland some asymmetric ingrowth R mid meeting left anterior/posterior with central tissue contact but midgand level patency     Estimated Blood Loss: none     Drains: none     Complications: none     Indications for procedure:  58 yo M with history of BPH and severe obstructive lower urinary tract symptoms progressing to irritative symptoms with urgency and frequency with rising in borderline PSA for which workup found significant prostate enlargement as well as small volume very low risk prostate cancer for which he has started active surveillance and underwent UroLift for his BPH/obstructing component in November of 2024 but has had persistent obstructive symptoms and bothersome symptoms in the absence of medication for which resuming medication did not change much in his perceived restriction, and his symptom complex was most consistent with meatal fossa navicularis stricture however a blue meatal dilator passed in clinic without any resistance.  He presents today for further evaluation     Procedure in detail:  After appropriate informed consent was obtained the patient was taken to the cystoscopy suite and placed in supine position.  He was prepped and draped in standard sterile fashion utilizing Hibiclens prep, and 2% xylocaine jelly was instilled per urethral meatus.     On exam, urethral meatal groove appearing  normal, though tip of Uro jet syringe tight at meatus, and tip of scope unable to pass per meatus consistent with meatal stenosis.  Despite previously accommodating blue meatal dilator without any resistance, at this time a blue meatal dilator was lubricated and passed per meatus with mild resistance, passively dilating meatus and fossa navicularis, accommodating a proximally 2/3 of the dilator.     At this time after passive dilation, digital flexible cystoscope was passed per urethra to bladder.  No resistance distally and mild blanching of fossa navicularis seen, and the remainder of the anterior urethra is normal.  Good sphincteric coaptation, and there were post UroLift changes as noted above open proximally and distally with some mild midgland residual tissue but with a small central channel at this area.  Inspection of bladder revealed no abnormalities except as based on trabeculations and on retroflexion outlet open and retraction/dimpling from implants could be seen with no exposed implants.     He tolerated the procedure well without complication and after teaching about blue meatal dilator, he ambulated to the recovery area without incident.       Disposition:   Home today status post uncomplicated procedure as above.  Findings today consistent with suspicion based on history and symptom description at last office visit of meatal/fossa navicularis narrowing, passively dilated and cystoscopy revealed post UroLift changes.  Despite the mild midgland tissue seen, open proximally and distally and would suspect that with patency of the distal urethra, symptoms should improve.  Meatal dilator taper protocol set below, and encouraged him to stop his alpha-blocker again in one-week.  Prophylactic antibiotics prescribed.  Will follow-up in March as planned and will re-evaluate his urinary symptoms as well as continued planning for his prostate cancer surveillance       Discharge home today status post  uncomplicated procedure as above  Diet - resume home diet  Follow up: 2 mos MD as scheduled with psa prior  Instructions:   Use blue meatal dilator with taper protocol as below.  Wash with soap and water between uses, and wash hands before use  Lubricate in plain [place] or plane water-based lubricant     Pass into urethral meatus until just snug, approximately 2/3.  Do this:  - 2 times per day for 2 weeks  - daily for the following 2 weeks  - every other day for the following 2 weeks after that  - once weekly indefinitely/pending further follow-up     May se blood in urine or have burning with urination x48-72 hours (aka blood drip from end of penis. Normal. And normal first few days of self dilating. Will slow/calm down)  Meds:     Medication List        START taking these medications      ciprofloxacin HCl 500 MG tablet  Commonly known as: CIPRO  Take 1 tablet (500 mg total) by mouth 2 (two) times daily. for 3 days            CONTINUE taking these medications      acetaminophen 325 MG tablet  Commonly known as: TYLENOL     alfuzosin 10 mg Tb24  Commonly known as: UROXATRAL  Take 1 tablet (10 mg total) by mouth every evening. for 14 days     pantoprazole 40 MG tablet  Commonly known as: PROTONIX     sildenafiL 100 MG tablet  Commonly known as: VIAGRA  Take 1 tablet (100 mg total) by mouth as needed for Erectile Dysfunction (ed).               Where to Get Your Medications        These medications were sent to [facility]/pharmacy #[id number] - [place], [place] - [address] AT [facility]  [address], [place] [address]      Phone: [phone]   ciprofloxacin HCl 500 MG tablet

## 2025-02-21 ENCOUNTER — LAB VISIT (OUTPATIENT)
Dept: LAB | Facility: HOSPITAL | Age: 60
End: 2025-02-21
Attending: UROLOGY
Payer: COMMERCIAL

## 2025-02-21 DIAGNOSIS — C61 PROSTATE CANCER: ICD-10-CM

## 2025-02-21 LAB — COMPLEXED PSA SERPL-MCNC: 7.3 NG/ML (ref 0–4)

## 2025-02-21 PROCEDURE — 84153 ASSAY OF PSA TOTAL: CPT | Performed by: UROLOGY

## 2025-02-21 PROCEDURE — 36415 COLL VENOUS BLD VENIPUNCTURE: CPT | Performed by: UROLOGY

## 2025-02-27 ENCOUNTER — RESULTS FOLLOW-UP (OUTPATIENT)
Dept: UROLOGY | Facility: HOSPITAL | Age: 60
End: 2025-02-27

## 2025-02-27 DIAGNOSIS — C61 PROSTATE CANCER: Primary | ICD-10-CM

## 2025-02-27 NOTE — PROGRESS NOTES
Interval rise in PSA   Has had interim prostate procedure, scopes, meatal dilator use etc since last psa and initial diagnosis  Was due for fu 3/14 (prev 3/7 but accepted r/s to 3/14 noting out of office on 3/7) though now I see that appt is canceled    Still avail fri 3/14 at 1130?  If so please rebook, otherwise 3/21 at 1130 ok    And set repeat psa diagnostic a few days prior - along with ua/ua micro/ucx - lab collect orders placed  (Dont use meatal dilator within 24 hours of lab/urine)

## 2025-03-28 ENCOUNTER — LAB VISIT (OUTPATIENT)
Dept: LAB | Facility: HOSPITAL | Age: 60
End: 2025-03-28
Attending: UROLOGY
Payer: COMMERCIAL

## 2025-03-28 DIAGNOSIS — C61 PROSTATE CANCER: ICD-10-CM

## 2025-03-28 LAB
BILIRUB UR QL STRIP.AUTO: NEGATIVE
CLARITY UR: CLEAR
COLOR UR AUTO: COLORLESS
GLUCOSE UR QL STRIP: NEGATIVE
HGB UR QL STRIP: NEGATIVE
KETONES UR QL STRIP: NEGATIVE
LEUKOCYTE ESTERASE UR QL STRIP: NEGATIVE
MICROSCOPIC COMMENT: NORMAL
NITRITE UR QL STRIP: NEGATIVE
PH UR STRIP: 7 [PH]
PROT UR QL STRIP: NEGATIVE
PSA SERPL-MCNC: 7.06 NG/ML (ref ?–4)
SP GR UR STRIP: 1
UROBILINOGEN UR STRIP-ACNC: NEGATIVE EU/DL
WBC #/AREA URNS AUTO: 1 /HPF

## 2025-03-28 PROCEDURE — 36415 COLL VENOUS BLD VENIPUNCTURE: CPT

## 2025-03-28 PROCEDURE — 84153 ASSAY OF PSA TOTAL: CPT

## 2025-03-28 PROCEDURE — 81003 URINALYSIS AUTO W/O SCOPE: CPT

## (undated) DEVICE — BAG LINGEMAN DRAIN UROLOGY

## (undated) DEVICE — SLEEVE SCD EXPRESS KNEE MEDIUM

## (undated) DEVICE — SPONGE COTTON TRAY 4X4IN

## (undated) DEVICE — GLOVE SENSICARE PI ALOE 6.5

## (undated) DEVICE — KIT ENDOKIT EGD/COLON/ERCP

## (undated) DEVICE — TUBING ARTHRO IRR 4-LEAD

## (undated) DEVICE — JELLY SURGILUBE LUBE TUBE 2OZ

## (undated) DEVICE — NDL SPINAL 22GX7 SPINOCAN

## (undated) DEVICE — GLOVE SENSICARE PI ALOE 7

## (undated) DEVICE — SET CYSTO IRR DRP CHMBR 84IN

## (undated) DEVICE — COVER PROBE 3D/4D

## (undated) DEVICE — Device

## (undated) DEVICE — SOL IRR WATER STRL 3000 ML

## (undated) DEVICE — KIT UROLIFT 2 CARTRIDGE HANDLE

## (undated) DEVICE — COVER TRANSDUCER LATEX N/STERI

## (undated) DEVICE — GUN BIOPSY 18GA MONOPLY

## (undated) DEVICE — GOWN POLY REINF BRTH SLV XL

## (undated) DEVICE — ELECTRODE REM PLYHSV RETURN 9

## (undated) DEVICE — PACK CYSTOSCOPY III SMH

## (undated) DEVICE — SCRUB DYNA-HEX LIQ 4% CHG 4OZ

## (undated) DEVICE — SYR 10CC LUER LOCK

## (undated) DEVICE — BAG DRAIN ANTI REFLUX 2000ML

## (undated) DEVICE — DEVICE ANC SW STAT FOLEY 6-24

## (undated) DEVICE — GUIDE BIOPSY BIPLANAR 18G

## (undated) DEVICE — SOL NACL IRR 1000ML BTL

## (undated) DEVICE — SYR 50ML CATH TIP

## (undated) DEVICE — ARMCRADLE LAMINECTOMY